# Patient Record
Sex: FEMALE | Race: WHITE
[De-identification: names, ages, dates, MRNs, and addresses within clinical notes are randomized per-mention and may not be internally consistent; named-entity substitution may affect disease eponyms.]

---

## 2018-02-13 ENCOUNTER — APPOINTMENT (OUTPATIENT)
Dept: INTERNAL MEDICINE | Facility: CLINIC | Age: 33
End: 2018-02-13
Payer: OTHER GOVERNMENT

## 2018-02-13 VITALS
OXYGEN SATURATION: 100 % | RESPIRATION RATE: 17 BRPM | WEIGHT: 180 LBS | DIASTOLIC BLOOD PRESSURE: 82 MMHG | SYSTOLIC BLOOD PRESSURE: 125 MMHG | TEMPERATURE: 98.1 F | BODY MASS INDEX: 27.28 KG/M2 | HEART RATE: 84 BPM | HEIGHT: 68 IN

## 2018-02-13 DIAGNOSIS — Z82.49 FAMILY HISTORY OF ISCHEMIC HEART DISEASE AND OTHER DISEASES OF THE CIRCULATORY SYSTEM: ICD-10-CM

## 2018-02-13 DIAGNOSIS — B97.7 PAPILLOMAVIRUS AS THE CAUSE OF DISEASES CLASSIFIED ELSEWHERE: ICD-10-CM

## 2018-02-13 PROCEDURE — 99204 OFFICE O/P NEW MOD 45 MIN: CPT

## 2018-02-13 RX ORDER — ELECTROLYTES/DEXTROSE
SOLUTION, ORAL ORAL
Refills: 0 | Status: ACTIVE | COMMUNITY

## 2018-03-06 ENCOUNTER — MEDICATION RENEWAL (OUTPATIENT)
Age: 33
End: 2018-03-06

## 2018-04-20 ENCOUNTER — LABORATORY RESULT (OUTPATIENT)
Age: 33
End: 2018-04-20

## 2018-04-20 ENCOUNTER — APPOINTMENT (OUTPATIENT)
Dept: INTERNAL MEDICINE | Facility: CLINIC | Age: 33
End: 2018-04-20
Payer: OTHER GOVERNMENT

## 2018-04-20 VITALS
DIASTOLIC BLOOD PRESSURE: 78 MMHG | OXYGEN SATURATION: 100 % | SYSTOLIC BLOOD PRESSURE: 122 MMHG | RESPIRATION RATE: 16 BRPM | HEART RATE: 81 BPM | TEMPERATURE: 98.3 F | BODY MASS INDEX: 26.22 KG/M2 | WEIGHT: 173 LBS | HEIGHT: 68 IN

## 2018-04-20 DIAGNOSIS — R00.2 PALPITATIONS: ICD-10-CM

## 2018-04-20 DIAGNOSIS — E66.3 OVERWEIGHT: ICD-10-CM

## 2018-04-20 DIAGNOSIS — R11.0 NAUSEA: ICD-10-CM

## 2018-04-20 DIAGNOSIS — R06.02 SHORTNESS OF BREATH: ICD-10-CM

## 2018-04-20 PROCEDURE — 99214 OFFICE O/P EST MOD 30 MIN: CPT

## 2018-04-20 RX ORDER — ALPRAZOLAM 0.25 MG/1
0.25 TABLET ORAL DAILY
Refills: 0 | Status: DISCONTINUED | COMMUNITY
End: 2018-04-20

## 2018-04-20 RX ORDER — PAROXETINE 25 MG/1
25 TABLET, FILM COATED, EXTENDED RELEASE ORAL
Qty: 30 | Refills: 1 | Status: DISCONTINUED | COMMUNITY
Start: 2018-02-13 | End: 2018-04-20

## 2018-04-22 PROBLEM — R06.02 SOB (SHORTNESS OF BREATH): Status: ACTIVE | Noted: 2018-04-20

## 2018-04-22 PROBLEM — R11.0 NAUSEA: Status: ACTIVE | Noted: 2018-02-13

## 2018-04-22 PROBLEM — R00.2 PALPITATION: Status: ACTIVE | Noted: 2018-04-20

## 2018-04-22 RX ORDER — ERGOCALCIFEROL 1.25 MG/1
1.25 MG CAPSULE, LIQUID FILLED ORAL
Qty: 8 | Refills: 0 | Status: ACTIVE | COMMUNITY
Start: 2018-04-22 | End: 1900-01-01

## 2018-04-23 DIAGNOSIS — R82.90 UNSPECIFIED ABNORMAL FINDINGS IN URINE: ICD-10-CM

## 2018-04-23 LAB
25(OH)D3 SERPL-MCNC: 5.3 NG/ML
ALBUMIN SERPL ELPH-MCNC: 4.5 G/DL
ALP BLD-CCNC: 46 U/L
ALT SERPL-CCNC: 18 U/L
ANION GAP SERPL CALC-SCNC: 14 MMOL/L
APPEARANCE: CLEAR
AST SERPL-CCNC: 24 U/L
BASOPHILS # BLD AUTO: 0.03 K/UL
BASOPHILS NFR BLD AUTO: 0.5 %
BILIRUB SERPL-MCNC: 0.2 MG/DL
BILIRUBIN URINE: NEGATIVE
BLOOD URINE: ABNORMAL
BUN SERPL-MCNC: 12 MG/DL
CALCIUM SERPL-MCNC: 9.2 MG/DL
CHLORIDE SERPL-SCNC: 103 MMOL/L
CHOLEST SERPL-MCNC: 164 MG/DL
CHOLEST/HDLC SERPL: 2.1 RATIO
CO2 SERPL-SCNC: 23 MMOL/L
COLOR: YELLOW
CREAT SERPL-MCNC: 0.7 MG/DL
EOSINOPHIL # BLD AUTO: 0.11 K/UL
EOSINOPHIL NFR BLD AUTO: 1.8 %
GLUCOSE QUALITATIVE U: NEGATIVE MG/DL
GLUCOSE SERPL-MCNC: 82 MG/DL
HCT VFR BLD CALC: 42.7 %
HDLC SERPL-MCNC: 77 MG/DL
HGB BLD-MCNC: 13.9 G/DL
IMM GRANULOCYTES NFR BLD AUTO: 0.2 %
KETONES URINE: NEGATIVE
LDLC SERPL CALC-MCNC: 78 MG/DL
LEUKOCYTE ESTERASE URINE: NEGATIVE
LYMPHOCYTES # BLD AUTO: 2.15 K/UL
LYMPHOCYTES NFR BLD AUTO: 34.3 %
MAN DIFF?: NORMAL
MCHC RBC-ENTMCNC: 32.2 PG
MCHC RBC-ENTMCNC: 32.6 GM/DL
MCV RBC AUTO: 98.8 FL
MONOCYTES # BLD AUTO: 0.58 K/UL
MONOCYTES NFR BLD AUTO: 9.3 %
NEUTROPHILS # BLD AUTO: 3.38 K/UL
NEUTROPHILS NFR BLD AUTO: 53.9 %
NITRITE URINE: NEGATIVE
PH URINE: 6.5
PLATELET # BLD AUTO: 324 K/UL
POTASSIUM SERPL-SCNC: 4.5 MMOL/L
PROT SERPL-MCNC: 7.7 G/DL
PROTEIN URINE: NEGATIVE MG/DL
RBC # BLD: 4.32 M/UL
RBC # FLD: 12.7 %
SODIUM SERPL-SCNC: 140 MMOL/L
SPECIFIC GRAVITY URINE: 1.02
TRIGL SERPL-MCNC: 43 MG/DL
TSH SERPL-ACNC: 1.06 UIU/ML
UROBILINOGEN URINE: NEGATIVE MG/DL
WBC # FLD AUTO: 6.26 K/UL

## 2018-07-24 ENCOUNTER — APPOINTMENT (OUTPATIENT)
Dept: PSYCHIATRY | Facility: CLINIC | Age: 33
End: 2018-07-24
Payer: OTHER GOVERNMENT

## 2018-07-24 PROCEDURE — 99205 OFFICE O/P NEW HI 60 MIN: CPT

## 2018-08-07 ENCOUNTER — RESULT REVIEW (OUTPATIENT)
Age: 33
End: 2018-08-07

## 2018-09-18 ENCOUNTER — APPOINTMENT (OUTPATIENT)
Dept: PSYCHIATRY | Facility: CLINIC | Age: 33
End: 2018-09-18
Payer: OTHER GOVERNMENT

## 2018-09-18 PROCEDURE — 99214 OFFICE O/P EST MOD 30 MIN: CPT

## 2018-09-18 RX ORDER — BUPROPION HYDROCHLORIDE 150 MG/1
150 TABLET, EXTENDED RELEASE ORAL DAILY
Qty: 30 | Refills: 2 | Status: DISCONTINUED | COMMUNITY
Start: 2018-04-20 | End: 2018-09-18

## 2018-10-09 ENCOUNTER — APPOINTMENT (OUTPATIENT)
Dept: PSYCHIATRY | Facility: CLINIC | Age: 33
End: 2018-10-09
Payer: OTHER GOVERNMENT

## 2018-10-09 PROCEDURE — 99213 OFFICE O/P EST LOW 20 MIN: CPT

## 2018-10-09 RX ORDER — GABAPENTIN 600 MG/1
600 TABLET, COATED ORAL 3 TIMES DAILY
Qty: 90 | Refills: 2 | Status: DISCONTINUED | COMMUNITY
Start: 2018-07-24 | End: 2018-10-09

## 2018-10-23 ENCOUNTER — APPOINTMENT (OUTPATIENT)
Dept: INTERNAL MEDICINE | Facility: CLINIC | Age: 33
End: 2018-10-23
Payer: OTHER GOVERNMENT

## 2018-10-23 VITALS
BODY MASS INDEX: 26.98 KG/M2 | WEIGHT: 178 LBS | TEMPERATURE: 98.3 F | HEIGHT: 68 IN | HEART RATE: 76 BPM | DIASTOLIC BLOOD PRESSURE: 84 MMHG | OXYGEN SATURATION: 100 % | RESPIRATION RATE: 16 BRPM | SYSTOLIC BLOOD PRESSURE: 133 MMHG

## 2018-10-23 PROCEDURE — 81002 URINALYSIS NONAUTO W/O SCOPE: CPT

## 2018-10-23 PROCEDURE — 99214 OFFICE O/P EST MOD 30 MIN: CPT

## 2018-10-23 NOTE — PHYSICAL EXAM
[General Appearance - Alert] : alert [General Appearance - In No Acute Distress] : in no acute distress [General Appearance - Well Nourished] : well nourished [General Appearance - Well-Appearing] : healthy appearing [Sclera] : the sclera and conjunctiva were normal [PERRL With Normal Accommodation] : pupils were equal in size, round, and reactive to light [Extraocular Movements] : extraocular movements were intact [Strabismus] : no strabismus was seen [Outer Ear] : the ears and nose were normal in appearance [Hearing Threshold Finger Rub Not [place]] : hearing was normal [Examination Of The Oral Cavity] : the lips and gums were normal [Both Tympanic Membranes Were Examined] : both tympanic membranes were normal [Nasal Cavity] : the nasal mucosa and septum were normal [Oropharynx] : the oropharynx was normal [Neck Appearance] : the appearance of the neck was normal [Neck Cervical Mass (___cm)] : no neck mass was observed [Jugular Venous Distention Increased] : there was no jugular-venous distention [] : no respiratory distress [Respiration, Rhythm And Depth] : normal respiratory rhythm and effort [Exaggerated Use Of Accessory Muscles For Inspiration] : no accessory muscle use [Auscultation Breath Sounds / Voice Sounds] : lungs were clear to auscultation bilaterally [Bowel Sounds] : normal bowel sounds [Abdomen Soft] : soft [Abdomen Tenderness] : non-tender [FreeTextEntry1] : no CVA [Cranial Nerves] : cranial nerves 2-12 were intact [Deep Tendon Reflexes (DTR)] : deep tendon reflexes were 2+ and symmetric [No Focal Deficits] : no focal deficits [Oriented To Time, Place, And Person] : oriented to person, place, and time [Impaired Insight] : insight and judgment were intact [Affect] : the affect was normal [Mood] : the mood was normal [Over the Past 2 Weeks, Have You Felt Down, Depressed, or Hopeless?] : 1.) Over the past 2 weeks, have you felt down, depressed, or hopeless? No [Over the Past 2 Weeks, Have You Felt Little Interest or Pleasure Doing Things?] : 2.) Over the past 2 weeks, have you felt little interest or pleasure doing things? No

## 2018-10-23 NOTE — PLAN
[FreeTextEntry1] : Assessment/ Plan: UTI\par -Refer to lab to provide clean urine sample for urinalysis and send out for culture\par -start empiric treatment with macrobid BID\par -f/u final culture and sensitivity to aid treatment decision if no improvement with bactrim \par - RTC if worsening symptoms, pelvic pain, fever/chills\par

## 2018-10-23 NOTE — HISTORY OF PRESENT ILLNESS
[FreeTextEntry8] : This 32 yo female  presents today complaining of increasing urinary frequency for the past day, she has been constantly going to the bathroom to urinate, and experiences a burning sensation when voiding. Today she also noted suprapubic pain. She also noted an odor. The patient reports that she has had a urinary tract infection before about 4 years ago, she was treated with antibiotics, and the infection seemed to clear up almost immediately.  Patient denies F/C.\par \par

## 2018-10-24 LAB
BILIRUB UR QL STRIP: NORMAL
CLARITY UR: NORMAL
COLLECTION METHOD: NORMAL
GLUCOSE UR-MCNC: NORMAL
HCG UR QL: 0.2 EU/DL
HGB UR QL STRIP.AUTO: NORMAL
KETONES UR-MCNC: NORMAL
LEUKOCYTE ESTERASE UR QL STRIP: NORMAL
NITRITE UR QL STRIP: POSITIVE
PH UR STRIP: 6
PROT UR STRIP-MCNC: NORMAL
SP GR UR STRIP: 1.01

## 2018-10-29 LAB — BACTERIA UR CULT: ABNORMAL

## 2018-11-21 ENCOUNTER — APPOINTMENT (OUTPATIENT)
Dept: PSYCHIATRY | Facility: CLINIC | Age: 33
End: 2018-11-21
Payer: OTHER GOVERNMENT

## 2018-11-21 DIAGNOSIS — G47.9 SLEEP DISORDER, UNSPECIFIED: ICD-10-CM

## 2018-11-21 PROCEDURE — 99213 OFFICE O/P EST LOW 20 MIN: CPT

## 2019-01-03 ENCOUNTER — RX RENEWAL (OUTPATIENT)
Age: 34
End: 2019-01-03

## 2019-02-05 ENCOUNTER — TRANSCRIPTION ENCOUNTER (OUTPATIENT)
Age: 34
End: 2019-02-05

## 2019-02-05 ENCOUNTER — RX RENEWAL (OUTPATIENT)
Age: 34
End: 2019-02-05

## 2019-02-11 ENCOUNTER — TRANSCRIPTION ENCOUNTER (OUTPATIENT)
Age: 34
End: 2019-02-11

## 2019-02-11 ENCOUNTER — APPOINTMENT (OUTPATIENT)
Dept: UROLOGY | Facility: CLINIC | Age: 34
End: 2019-02-11
Payer: OTHER GOVERNMENT

## 2019-02-11 DIAGNOSIS — R35.0 FREQUENCY OF MICTURITION: ICD-10-CM

## 2019-02-11 DIAGNOSIS — R39.15 URGENCY OF URINATION: ICD-10-CM

## 2019-02-11 DIAGNOSIS — Z87.442 PERSONAL HISTORY OF URINARY CALCULI: ICD-10-CM

## 2019-02-11 DIAGNOSIS — Z80.52 FAMILY HISTORY OF MALIGNANT NEOPLASM OF BLADDER: ICD-10-CM

## 2019-02-11 PROCEDURE — 99242 OFF/OP CONSLTJ NEW/EST SF 20: CPT

## 2019-02-11 RX ORDER — ALPRAZOLAM 0.25 MG/1
0.25 TABLET ORAL
Qty: 30 | Refills: 0 | Status: DISCONTINUED | COMMUNITY
Start: 2018-02-13 | End: 2019-02-11

## 2019-02-11 RX ORDER — NITROFURANTOIN (MONOHYDRATE/MACROCRYSTALS) 25; 75 MG/1; MG/1
100 CAPSULE ORAL TWICE DAILY
Qty: 14 | Refills: 0 | Status: DISCONTINUED | COMMUNITY
Start: 2018-10-23 | End: 2019-02-11

## 2019-02-11 NOTE — PHYSICAL EXAM
[General Appearance - Well Developed] : well developed [General Appearance - Well Nourished] : well nourished [Normal Appearance] : normal appearance [Well Groomed] : well groomed [General Appearance - In No Acute Distress] : no acute distress [Heart Rate And Rhythm] : Heart rate and rhythm were normal [Edema] : no peripheral edema [Respiration, Rhythm And Depth] : normal respiratory rhythm and effort [Exaggerated Use Of Accessory Muscles For Inspiration] : no accessory muscle use [Abdomen Soft] : soft [Abdomen Tenderness] : non-tender [Costovertebral Angle Tenderness] : no ~M costovertebral angle tenderness [Normal Station and Gait] : the gait and station were normal for the patient's age [Skin Color & Pigmentation] : normal skin color and pigmentation [] : no rash [Skin Lesions] : no skin lesions

## 2019-02-11 NOTE — ASSESSMENT
[FreeTextEntry1] : Pt w/ asymptomatic Gross Hematuria and no s/sx's of stones, but history of them w/ no imaging in many years.\par \par Discussed the multiple etiologies as cause for GH and importance of full  evaluation w/ : urine studies, cystosocpy and a ct urogram.  She demonstrated understanding and knows to contact me earlier than scheduled f/u appt. if any recurrent GH. No more abx as most recent cx neg. She is also aware that urgency/frequency can be exacerbated by many things:  food, fluid intake, what she eats, drinks; stress levels, bowel habits, a distal ureteral calculus or bladder neoplasm, etc.\par \par All questions answered to her satisfaction.

## 2019-02-11 NOTE — HISTORY OF PRESENT ILLNESS
[FreeTextEntry1] : SHAUNA LIZARRAGA is a 34 year old F who presents with inc urgency, frequency. Went to an urgicenter when she developed gross hematuria and was passing blood clots. Had GH only one other time, at age 17, but in setting of pos cx.  No h/o pain, dysuria, flank pain or discharge.  She was started on bactrim, which she took for 5 d prior to being called w/ a neg culture. No UA available from 2/5/19 visit.  Has h/o stones, but denies any similar sx's or constitutional sx's.\par

## 2019-02-11 NOTE — REVIEW OF SYSTEMS
[Fever] : fever [Chills] : chills [Feeling Poorly] : feeling poorly [Feeling Tired] : feeling tired [Palpitations] : palpitations [Shortness Of Breath] : shortness of breath [Wheezing] : wheezing [Abdominal Pain] : abdominal pain [Vomiting] : vomiting [Urine Infection (bladder/kidney)] : bladder/kidney infection [Pain during urination] : pain during urination [Blood in urine that you can see] : blood visible in urine [Told you have blood in urine on a urine test] : told blood was present in a urine test [History of kidney stones] : history of kidney stones [Wake up at night to urinate  How many times?  ___] : wakes up to urinate [unfilled] times during the night [Bladder pressure] : experiences bladder pressure [Negative] : Heme/Lymph [see HPI] : see HPI [Rapid Breathing] : rapid breathing [Anxiety] : anxiety [Depression] : depression [FreeTextEntry1] : nl periods; has breast lump and is due for breast imaging per pcp

## 2019-02-13 ENCOUNTER — MESSAGE (OUTPATIENT)
Age: 34
End: 2019-02-13

## 2019-02-19 LAB
APPEARANCE: CLEAR
BACTERIA UR CULT: NORMAL
BACTERIA: NEGATIVE
BILIRUBIN URINE: NEGATIVE
BLOOD URINE: NEGATIVE
COLOR: YELLOW
GLUCOSE QUALITATIVE U: NEGATIVE MG/DL
HYALINE CASTS: 2 /LPF
KETONES URINE: NEGATIVE
LEUKOCYTE ESTERASE URINE: NEGATIVE
MICROSCOPIC-UA: NORMAL
NITRITE URINE: NEGATIVE
PH URINE: 5.5
PROTEIN URINE: NEGATIVE MG/DL
RED BLOOD CELLS URINE: 2 /HPF
SPECIFIC GRAVITY URINE: 1.01
SQUAMOUS EPITHELIAL CELLS: 2 /HPF
UROBILINOGEN URINE: NEGATIVE MG/DL
WHITE BLOOD CELLS URINE: 2 /HPF

## 2019-02-20 ENCOUNTER — RESULT REVIEW (OUTPATIENT)
Age: 34
End: 2019-02-20

## 2019-02-21 ENCOUNTER — APPOINTMENT (OUTPATIENT)
Dept: CT IMAGING | Facility: CLINIC | Age: 34
End: 2019-02-21

## 2019-02-25 ENCOUNTER — APPOINTMENT (OUTPATIENT)
Dept: PSYCHIATRY | Facility: CLINIC | Age: 34
End: 2019-02-25
Payer: OTHER GOVERNMENT

## 2019-02-25 PROCEDURE — 99214 OFFICE O/P EST MOD 30 MIN: CPT

## 2019-02-27 ENCOUNTER — APPOINTMENT (OUTPATIENT)
Dept: CT IMAGING | Facility: CLINIC | Age: 34
End: 2019-02-27
Payer: OTHER GOVERNMENT

## 2019-02-27 ENCOUNTER — OUTPATIENT (OUTPATIENT)
Dept: OUTPATIENT SERVICES | Facility: HOSPITAL | Age: 34
LOS: 1 days | End: 2019-02-27
Payer: OTHER GOVERNMENT

## 2019-02-27 DIAGNOSIS — Z00.8 ENCOUNTER FOR OTHER GENERAL EXAMINATION: ICD-10-CM

## 2019-02-27 DIAGNOSIS — R35.0 FREQUENCY OF MICTURITION: ICD-10-CM

## 2019-02-27 DIAGNOSIS — Z87.442 PERSONAL HISTORY OF URINARY CALCULI: ICD-10-CM

## 2019-02-27 DIAGNOSIS — R31.0 GROSS HEMATURIA: ICD-10-CM

## 2019-02-27 PROCEDURE — 74178 CT ABD&PLV WO CNTR FLWD CNTR: CPT

## 2019-02-27 PROCEDURE — 74178 CT ABD&PLV WO CNTR FLWD CNTR: CPT | Mod: 26

## 2019-03-07 ENCOUNTER — APPOINTMENT (OUTPATIENT)
Dept: INTERNAL MEDICINE | Facility: CLINIC | Age: 34
End: 2019-03-07
Payer: OTHER GOVERNMENT

## 2019-03-07 ENCOUNTER — TRANSCRIPTION ENCOUNTER (OUTPATIENT)
Age: 34
End: 2019-03-07

## 2019-03-07 ENCOUNTER — OTHER (OUTPATIENT)
Age: 34
End: 2019-03-07

## 2019-03-07 VITALS
TEMPERATURE: 98.2 F | HEIGHT: 68 IN | BODY MASS INDEX: 27.28 KG/M2 | HEART RATE: 86 BPM | DIASTOLIC BLOOD PRESSURE: 76 MMHG | SYSTOLIC BLOOD PRESSURE: 114 MMHG | RESPIRATION RATE: 17 BRPM | OXYGEN SATURATION: 99 % | WEIGHT: 180 LBS

## 2019-03-07 DIAGNOSIS — R22.9 LOCALIZED SWELLING, MASS AND LUMP, UNSPECIFIED: ICD-10-CM

## 2019-03-07 PROCEDURE — 99395 PREV VISIT EST AGE 18-39: CPT

## 2019-03-12 LAB
25(OH)D3 SERPL-MCNC: 23.9 NG/ML
ALBUMIN SERPL ELPH-MCNC: 4.8 G/DL
ALP BLD-CCNC: 61 U/L
ALT SERPL-CCNC: 37 U/L
ANION GAP SERPL CALC-SCNC: 12 MMOL/L
APPEARANCE: CLEAR
AST SERPL-CCNC: 34 U/L
BASOPHILS # BLD AUTO: 0.05 K/UL
BASOPHILS NFR BLD AUTO: 0.7 %
BILIRUB SERPL-MCNC: 0.3 MG/DL
BILIRUBIN URINE: NEGATIVE
BLOOD URINE: NEGATIVE
BUN SERPL-MCNC: 7 MG/DL
CALCIUM SERPL-MCNC: 9.6 MG/DL
CHLORIDE SERPL-SCNC: 101 MMOL/L
CHOLEST SERPL-MCNC: 166 MG/DL
CHOLEST/HDLC SERPL: 1.8 RATIO
CO2 SERPL-SCNC: 25 MMOL/L
COLOR: COLORLESS
CREAT SERPL-MCNC: 0.69 MG/DL
EOSINOPHIL # BLD AUTO: 0.15 K/UL
EOSINOPHIL NFR BLD AUTO: 2.2 %
GLUCOSE QUALITATIVE U: NEGATIVE
GLUCOSE SERPL-MCNC: 75 MG/DL
HBA1C MFR BLD HPLC: 4.9 %
HCT VFR BLD CALC: 43.7 %
HDLC SERPL-MCNC: 95 MG/DL
HGB BLD-MCNC: 14.4 G/DL
IMM GRANULOCYTES NFR BLD AUTO: 0.1 %
KETONES URINE: NEGATIVE
LDLC SERPL CALC-MCNC: 61 MG/DL
LEUKOCYTE ESTERASE URINE: NEGATIVE
LYMPHOCYTES # BLD AUTO: 2.2 K/UL
LYMPHOCYTES NFR BLD AUTO: 32.8 %
MAN DIFF?: NORMAL
MCHC RBC-ENTMCNC: 32.9 PG
MCHC RBC-ENTMCNC: 33 GM/DL
MCV RBC AUTO: 99.8 FL
MONOCYTES # BLD AUTO: 0.74 K/UL
MONOCYTES NFR BLD AUTO: 11 %
NEUTROPHILS # BLD AUTO: 3.56 K/UL
NEUTROPHILS NFR BLD AUTO: 53.2 %
NITRITE URINE: NEGATIVE
PH URINE: 6.5
PLATELET # BLD AUTO: 311 K/UL
POTASSIUM SERPL-SCNC: 4.5 MMOL/L
PROT SERPL-MCNC: 7.7 G/DL
PROTEIN URINE: NEGATIVE
RBC # BLD: 4.38 M/UL
RBC # FLD: 12.6 %
SODIUM SERPL-SCNC: 138 MMOL/L
SPECIFIC GRAVITY URINE: 1.01
TRIGL SERPL-MCNC: 52 MG/DL
TSH SERPL-ACNC: 1.55 UIU/ML
UROBILINOGEN URINE: NORMAL
WBC # FLD AUTO: 6.71 K/UL

## 2019-03-25 ENCOUNTER — APPOINTMENT (OUTPATIENT)
Dept: PSYCHIATRY | Facility: CLINIC | Age: 34
End: 2019-03-25

## 2019-03-26 ENCOUNTER — APPOINTMENT (OUTPATIENT)
Dept: UROLOGY | Facility: CLINIC | Age: 34
End: 2019-03-26
Payer: OTHER GOVERNMENT

## 2019-03-26 VITALS
WEIGHT: 180 LBS | RESPIRATION RATE: 16 BRPM | OXYGEN SATURATION: 100 % | DIASTOLIC BLOOD PRESSURE: 83 MMHG | SYSTOLIC BLOOD PRESSURE: 129 MMHG | BODY MASS INDEX: 27.28 KG/M2 | TEMPERATURE: 97.2 F | HEART RATE: 91 BPM | HEIGHT: 68 IN

## 2019-03-26 DIAGNOSIS — R87.619 UNSPECIFIED ABNORMAL CYTOLOGICAL FINDINGS IN SPECIMENS FROM CERVIX UTERI: ICD-10-CM

## 2019-03-26 DIAGNOSIS — N20.0 CALCULUS OF KIDNEY: ICD-10-CM

## 2019-03-26 DIAGNOSIS — R31.0 GROSS HEMATURIA: ICD-10-CM

## 2019-03-26 PROCEDURE — 99213 OFFICE O/P EST LOW 20 MIN: CPT | Mod: 25

## 2019-03-26 PROCEDURE — 52000 CYSTOURETHROSCOPY: CPT

## 2019-03-26 RX ADMIN — PHENAZOPYRIDINE 0 MG: 100 TABLET, FILM COATED ORAL at 00:00

## 2019-03-26 NOTE — LETTER BODY
[Consult Letter:] : I had the pleasure of evaluating your patient, [unfilled]. [Please see my note below.] : Please see my note below. [Consult Closing:] : Thank you very much for allowing me to participate in the care of this patient.  If you have any questions, please do not hesitate to contact me. [Sincerely,] : Sincerely, [Dear  ___] : Dear  [unfilled], [FreeTextEntry3] : Sincerely,\par \par Rahel Robledo MD\par [facility]\par

## 2019-03-26 NOTE — HISTORY OF PRESENT ILLNESS
[FreeTextEntry1] : SHAUNA LIZARRAGA is a 34 year old F who presents with no current or further GH and no flank pain, w/ new dx of b/l renal calculi, w the largest being 5 mm in LK. Here for cystoscopy. Reminded that on cytology there were "rare atypical squamous cells" and that she needs follow up w/ a gyn in the very near future. Pt has a history of a few or repeated abnormal PAP smears and needs a new Gyn. Apparently, hers had told her she could wait 3-5 yrs to repeat another.\par \par Currently voiding w/out difficulty, flank pain or any S/Sx's of UTI, which she does get periodically. No vag discharge.

## 2019-03-26 NOTE — ASSESSMENT
[FreeTextEntry1] : Reviewed CT urogram and findings. We discussed maintaining a normal calcium, low sodium diet and need for increased hydration w/ 2.5-3 L/d, with most of it being water.  She should perform a 24 hr urine collection to further assess her urinary milieu, in order that she can make appropriate dietary modifications and/or w/ prescribed medications, we can dec the chance for inc in size of stones and/or inc in number, i.e. total stone burden.\par \par Should RTC 2 wks after mailing in the urine.  \par \par Was given referral to the [place] group of gynecologists, who also see pts in my office space in [place].\par \par Cystoscopy was normal and she was instructed to contact me w any GH, N/V/F/C or rigors in setting of flank pain and or UTI. Pyridium tablets given if needed for burning. Aware that it turns urine orange.

## 2019-03-28 ENCOUNTER — RX RENEWAL (OUTPATIENT)
Age: 34
End: 2019-03-28

## 2019-03-28 LAB
BACTERIA UR CULT: NORMAL
URINE CYTOLOGY: NORMAL

## 2019-04-04 ENCOUNTER — APPOINTMENT (OUTPATIENT)
Dept: PSYCHIATRY | Facility: CLINIC | Age: 34
End: 2019-04-04
Payer: OTHER GOVERNMENT

## 2019-04-04 PROCEDURE — 99214 OFFICE O/P EST MOD 30 MIN: CPT

## 2019-04-24 ENCOUNTER — RX RENEWAL (OUTPATIENT)
Age: 34
End: 2019-04-24

## 2019-06-16 ENCOUNTER — TRANSCRIPTION ENCOUNTER (OUTPATIENT)
Age: 34
End: 2019-06-16

## 2019-06-28 ENCOUNTER — APPOINTMENT (OUTPATIENT)
Dept: PSYCHIATRY | Facility: CLINIC | Age: 34
End: 2019-06-28
Payer: OTHER GOVERNMENT

## 2019-07-02 ENCOUNTER — APPOINTMENT (OUTPATIENT)
Dept: PSYCHIATRY | Facility: CLINIC | Age: 34
End: 2019-07-02
Payer: OTHER GOVERNMENT

## 2019-07-02 PROCEDURE — 99214 OFFICE O/P EST MOD 30 MIN: CPT

## 2019-07-18 ENCOUNTER — APPOINTMENT (OUTPATIENT)
Dept: DERMATOLOGY | Facility: CLINIC | Age: 34
End: 2019-07-18

## 2019-07-31 ENCOUNTER — APPOINTMENT (OUTPATIENT)
Dept: INTERNAL MEDICINE | Facility: CLINIC | Age: 34
End: 2019-07-31

## 2019-09-27 ENCOUNTER — RX RENEWAL (OUTPATIENT)
Age: 34
End: 2019-09-27

## 2019-10-01 ENCOUNTER — APPOINTMENT (OUTPATIENT)
Dept: PSYCHIATRY | Facility: CLINIC | Age: 34
End: 2019-10-01

## 2019-10-23 ENCOUNTER — APPOINTMENT (OUTPATIENT)
Dept: PSYCHIATRY | Facility: CLINIC | Age: 34
End: 2019-10-23
Payer: OTHER GOVERNMENT

## 2019-10-23 PROCEDURE — 99214 OFFICE O/P EST MOD 30 MIN: CPT

## 2019-10-29 ENCOUNTER — MEDICATION RENEWAL (OUTPATIENT)
Age: 34
End: 2019-10-29

## 2019-12-04 ENCOUNTER — MEDICATION RENEWAL (OUTPATIENT)
Age: 34
End: 2019-12-04

## 2020-01-02 ENCOUNTER — MEDICATION RENEWAL (OUTPATIENT)
Age: 35
End: 2020-01-02

## 2020-01-02 ENCOUNTER — TRANSCRIPTION ENCOUNTER (OUTPATIENT)
Age: 35
End: 2020-01-02

## 2020-01-06 ENCOUNTER — APPOINTMENT (OUTPATIENT)
Dept: INTERNAL MEDICINE | Facility: CLINIC | Age: 35
End: 2020-01-06
Payer: OTHER GOVERNMENT

## 2020-01-06 VITALS
SYSTOLIC BLOOD PRESSURE: 119 MMHG | TEMPERATURE: 97.6 F | HEART RATE: 89 BPM | HEIGHT: 68 IN | WEIGHT: 172 LBS | DIASTOLIC BLOOD PRESSURE: 83 MMHG | OXYGEN SATURATION: 100 % | RESPIRATION RATE: 17 BRPM | BODY MASS INDEX: 26.07 KG/M2

## 2020-01-06 DIAGNOSIS — R11.2 NAUSEA WITH VOMITING, UNSPECIFIED: ICD-10-CM

## 2020-01-06 DIAGNOSIS — K52.9 NONINFECTIVE GASTROENTERITIS AND COLITIS, UNSPECIFIED: ICD-10-CM

## 2020-01-06 PROCEDURE — 99214 OFFICE O/P EST MOD 30 MIN: CPT

## 2020-01-06 RX ORDER — ONDANSETRON 4 MG/1
4 TABLET, ORALLY DISINTEGRATING ORAL EVERY 8 HOURS
Qty: 30 | Refills: 0 | Status: DISCONTINUED | COMMUNITY
Start: 2020-01-06 | End: 2020-01-06

## 2020-01-14 ENCOUNTER — APPOINTMENT (OUTPATIENT)
Dept: PSYCHIATRY | Facility: CLINIC | Age: 35
End: 2020-01-14
Payer: OTHER GOVERNMENT

## 2020-01-14 PROCEDURE — 99213 OFFICE O/P EST LOW 20 MIN: CPT

## 2020-03-24 ENCOUNTER — APPOINTMENT (OUTPATIENT)
Dept: INTERNAL MEDICINE | Facility: CLINIC | Age: 35
End: 2020-03-24

## 2020-04-09 ENCOUNTER — APPOINTMENT (OUTPATIENT)
Dept: PSYCHIATRY | Facility: CLINIC | Age: 35
End: 2020-04-09
Payer: OTHER GOVERNMENT

## 2020-04-09 PROCEDURE — 99442: CPT

## 2020-04-09 RX ORDER — MIRTAZAPINE 15 MG/1
15 TABLET, FILM COATED ORAL
Qty: 30 | Refills: 2 | Status: DISCONTINUED | COMMUNITY
Start: 2019-02-25 | End: 2020-04-09

## 2020-04-22 ENCOUNTER — APPOINTMENT (OUTPATIENT)
Dept: DERMATOLOGY | Facility: CLINIC | Age: 35
End: 2020-04-22

## 2020-05-22 ENCOUNTER — TRANSCRIPTION ENCOUNTER (OUTPATIENT)
Age: 35
End: 2020-05-22

## 2020-06-10 RX ORDER — ALPRAZOLAM 0.5 MG/1
0.5 TABLET ORAL
Qty: 15 | Refills: 0 | Status: DISCONTINUED | COMMUNITY
Start: 2018-07-24 | End: 2020-06-10

## 2020-07-07 ENCOUNTER — APPOINTMENT (OUTPATIENT)
Dept: PSYCHIATRY | Facility: CLINIC | Age: 35
End: 2020-07-07
Payer: OTHER GOVERNMENT

## 2020-07-07 PROCEDURE — 99213 OFFICE O/P EST LOW 20 MIN: CPT

## 2020-07-07 RX ORDER — ALPRAZOLAM 0.5 MG/1
0.5 TABLET, EXTENDED RELEASE ORAL
Qty: 7 | Refills: 0 | Status: DISCONTINUED | COMMUNITY
Start: 2020-06-10 | End: 2020-07-07

## 2020-07-07 RX ORDER — HYDROXYZINE HYDROCHLORIDE 25 MG/1
25 TABLET ORAL
Qty: 120 | Refills: 1 | Status: DISCONTINUED | COMMUNITY
Start: 2020-04-09 | End: 2020-07-07

## 2020-08-04 ENCOUNTER — APPOINTMENT (OUTPATIENT)
Dept: INTERNAL MEDICINE | Facility: CLINIC | Age: 35
End: 2020-08-04
Payer: OTHER GOVERNMENT

## 2020-08-04 VITALS
HEART RATE: 70 BPM | DIASTOLIC BLOOD PRESSURE: 82 MMHG | WEIGHT: 182 LBS | BODY MASS INDEX: 27.58 KG/M2 | OXYGEN SATURATION: 99 % | HEIGHT: 68 IN | SYSTOLIC BLOOD PRESSURE: 119 MMHG | RESPIRATION RATE: 17 BRPM | TEMPERATURE: 97.6 F

## 2020-08-04 DIAGNOSIS — Z00.00 ENCOUNTER FOR GENERAL ADULT MEDICAL EXAMINATION W/OUT ABNORMAL FINDINGS: ICD-10-CM

## 2020-08-04 DIAGNOSIS — E55.9 VITAMIN D DEFICIENCY, UNSPECIFIED: ICD-10-CM

## 2020-08-04 DIAGNOSIS — R21 RASH AND OTHER NONSPECIFIC SKIN ERUPTION: ICD-10-CM

## 2020-08-04 PROCEDURE — 99385 PREV VISIT NEW AGE 18-39: CPT

## 2020-08-04 RX ORDER — ESCITALOPRAM OXALATE 20 MG/1
20 TABLET ORAL
Refills: 0 | Status: ACTIVE | COMMUNITY
Start: 2020-08-04

## 2020-08-05 LAB
25(OH)D3 SERPL-MCNC: 38.1 NG/ML
ALBUMIN SERPL ELPH-MCNC: 4.4 G/DL
ALP BLD-CCNC: 65 U/L
ALT SERPL-CCNC: 24 U/L
ANION GAP SERPL CALC-SCNC: 12 MMOL/L
APPEARANCE: CLEAR
AST SERPL-CCNC: 29 U/L
BASOPHILS # BLD AUTO: 0.04 K/UL
BASOPHILS NFR BLD AUTO: 0.6 %
BILIRUB SERPL-MCNC: 0.3 MG/DL
BILIRUBIN URINE: NEGATIVE
BLOOD URINE: NEGATIVE
BUN SERPL-MCNC: 14 MG/DL
CALCIUM SERPL-MCNC: 9.6 MG/DL
CHLORIDE SERPL-SCNC: 104 MMOL/L
CHOLEST SERPL-MCNC: 173 MG/DL
CHOLEST/HDLC SERPL: 1.9 RATIO
CO2 SERPL-SCNC: 24 MMOL/L
COLOR: NORMAL
CREAT SERPL-MCNC: 0.7 MG/DL
EOSINOPHIL # BLD AUTO: 0.13 K/UL
EOSINOPHIL NFR BLD AUTO: 1.9 %
ESTIMATED AVERAGE GLUCOSE: 85 MG/DL
GLUCOSE QUALITATIVE U: NEGATIVE
GLUCOSE SERPL-MCNC: 88 MG/DL
HBA1C MFR BLD HPLC: 4.6 %
HCT VFR BLD CALC: 40.8 %
HDLC SERPL-MCNC: 93 MG/DL
HGB BLD-MCNC: 13.2 G/DL
IMM GRANULOCYTES NFR BLD AUTO: 0.3 %
KETONES URINE: NEGATIVE
LDLC SERPL CALC-MCNC: 63 MG/DL
LEUKOCYTE ESTERASE URINE: NEGATIVE
LYMPHOCYTES # BLD AUTO: 1.61 K/UL
LYMPHOCYTES NFR BLD AUTO: 23.6 %
MAN DIFF?: NORMAL
MCHC RBC-ENTMCNC: 32.4 GM/DL
MCHC RBC-ENTMCNC: 32.8 PG
MCV RBC AUTO: 101.2 FL
MONOCYTES # BLD AUTO: 0.87 K/UL
MONOCYTES NFR BLD AUTO: 12.8 %
NEUTROPHILS # BLD AUTO: 4.14 K/UL
NEUTROPHILS NFR BLD AUTO: 60.8 %
NITRITE URINE: NEGATIVE
PH URINE: 6.5
PLATELET # BLD AUTO: 301 K/UL
POTASSIUM SERPL-SCNC: 4.2 MMOL/L
PROT SERPL-MCNC: 7.1 G/DL
PROTEIN URINE: NORMAL
RBC # BLD: 4.03 M/UL
RBC # FLD: 12.7 %
SODIUM SERPL-SCNC: 139 MMOL/L
SPECIFIC GRAVITY URINE: 1.02
TRIGL SERPL-MCNC: 84 MG/DL
TSH SERPL-ACNC: 2.38 UIU/ML
UROBILINOGEN URINE: NORMAL
WBC # FLD AUTO: 6.81 K/UL

## 2020-08-05 RX ORDER — CLOTRIMAZOLE AND BETAMETHASONE DIPROPIONATE 10; .5 MG/G; MG/G
1-0.05 CREAM TOPICAL TWICE DAILY
Qty: 2 | Refills: 3 | Status: ACTIVE | COMMUNITY
Start: 2020-08-04 | End: 1900-01-01

## 2020-09-29 ENCOUNTER — APPOINTMENT (OUTPATIENT)
Dept: INTERNAL MEDICINE | Facility: CLINIC | Age: 35
End: 2020-09-29

## 2020-10-07 ENCOUNTER — APPOINTMENT (OUTPATIENT)
Dept: PSYCHIATRY | Facility: CLINIC | Age: 35
End: 2020-10-07
Payer: OTHER GOVERNMENT

## 2020-10-07 PROCEDURE — 99214 OFFICE O/P EST MOD 30 MIN: CPT

## 2020-12-08 ENCOUNTER — TRANSCRIPTION ENCOUNTER (OUTPATIENT)
Age: 35
End: 2020-12-08

## 2021-01-12 ENCOUNTER — APPOINTMENT (OUTPATIENT)
Dept: PSYCHIATRY | Facility: CLINIC | Age: 36
End: 2021-01-12
Payer: OTHER GOVERNMENT

## 2021-01-12 DIAGNOSIS — F40.10 SOCIAL PHOBIA, UNSPECIFIED: ICD-10-CM

## 2021-01-12 PROCEDURE — 99214 OFFICE O/P EST MOD 30 MIN: CPT

## 2021-01-12 PROCEDURE — 99072 ADDL SUPL MATRL&STAF TM PHE: CPT

## 2021-03-09 ENCOUNTER — TRANSCRIPTION ENCOUNTER (OUTPATIENT)
Age: 36
End: 2021-03-09

## 2021-04-14 ENCOUNTER — APPOINTMENT (OUTPATIENT)
Dept: PSYCHIATRY | Facility: CLINIC | Age: 36
End: 2021-04-14
Payer: OTHER GOVERNMENT

## 2021-04-14 DIAGNOSIS — G47.00 INSOMNIA, UNSPECIFIED: ICD-10-CM

## 2021-04-14 DIAGNOSIS — F32.9 ANXIETY DISORDER, UNSPECIFIED: ICD-10-CM

## 2021-04-14 DIAGNOSIS — F41.0 PANIC DISORDER [EPISODIC PAROXYSMAL ANXIETY]: ICD-10-CM

## 2021-04-14 DIAGNOSIS — F41.9 ANXIETY DISORDER, UNSPECIFIED: ICD-10-CM

## 2021-04-14 PROCEDURE — 99072 ADDL SUPL MATRL&STAF TM PHE: CPT

## 2021-04-14 PROCEDURE — 99214 OFFICE O/P EST MOD 30 MIN: CPT

## 2021-04-14 RX ORDER — ESCITALOPRAM OXALATE 20 MG/1
20 TABLET ORAL
Qty: 90 | Refills: 0 | Status: ACTIVE | COMMUNITY
Start: 2018-07-24 | End: 1900-01-01

## 2021-04-15 RX ORDER — PROPRANOLOL HYDROCHLORIDE 20 MG/1
20 TABLET ORAL
Qty: 90 | Refills: 2 | Status: ACTIVE | COMMUNITY
Start: 2018-02-13 | End: 1900-01-01

## 2021-04-22 RX ORDER — LORAZEPAM 0.5 MG/1
0.5 TABLET ORAL DAILY
Qty: 30 | Refills: 0 | Status: ACTIVE | COMMUNITY
Start: 2019-02-25 | End: 1900-01-01

## 2021-04-24 RX ORDER — ONDANSETRON 4 MG/1
4 TABLET ORAL EVERY 8 HOURS
Qty: 30 | Refills: 0 | Status: ACTIVE | COMMUNITY
Start: 2018-02-13 | End: 1900-01-01

## 2021-12-19 ENCOUNTER — CLINICAL SUPPORT (OUTPATIENT)
Dept: URGENT CARE | Facility: CLINIC | Age: 36
End: 2021-12-19
Payer: OTHER GOVERNMENT

## 2021-12-19 DIAGNOSIS — Z11.59 ENCOUNTER FOR SCREENING FOR OTHER VIRAL DISEASES: Primary | ICD-10-CM

## 2021-12-19 LAB
CTP QC/QA: YES
SARS-COV-2 RDRP RESP QL NAA+PROBE: NEGATIVE

## 2021-12-19 PROCEDURE — U0002: ICD-10-PCS | Mod: QW,S$GLB,, | Performed by: NURSE PRACTITIONER

## 2021-12-19 PROCEDURE — U0002 COVID-19 LAB TEST NON-CDC: HCPCS | Mod: QW,S$GLB,, | Performed by: NURSE PRACTITIONER

## 2022-01-10 ENCOUNTER — OFFICE VISIT (OUTPATIENT)
Dept: URGENT CARE | Facility: CLINIC | Age: 37
End: 2022-01-10
Payer: OTHER GOVERNMENT

## 2022-01-10 VITALS
WEIGHT: 175 LBS | BODY MASS INDEX: 26.52 KG/M2 | DIASTOLIC BLOOD PRESSURE: 72 MMHG | SYSTOLIC BLOOD PRESSURE: 121 MMHG | HEIGHT: 68 IN | OXYGEN SATURATION: 97 % | RESPIRATION RATE: 16 BRPM | TEMPERATURE: 97 F | HEART RATE: 80 BPM

## 2022-01-10 DIAGNOSIS — B34.9 ACUTE VIRAL SYNDROME: Primary | ICD-10-CM

## 2022-01-10 DIAGNOSIS — R05.9 COUGH: ICD-10-CM

## 2022-01-10 DIAGNOSIS — R11.0 NAUSEA: ICD-10-CM

## 2022-01-10 DIAGNOSIS — R52 GENERALIZED BODY ACHES: ICD-10-CM

## 2022-01-10 LAB
CTP QC/QA: YES
SARS-COV-2 RDRP RESP QL NAA+PROBE: NEGATIVE

## 2022-01-10 PROCEDURE — U0002: ICD-10-PCS | Mod: QW,S$GLB,, | Performed by: NURSE PRACTITIONER

## 2022-01-10 PROCEDURE — 99203 OFFICE O/P NEW LOW 30 MIN: CPT | Mod: S$GLB,,, | Performed by: NURSE PRACTITIONER

## 2022-01-10 PROCEDURE — U0002 COVID-19 LAB TEST NON-CDC: HCPCS | Mod: QW,S$GLB,, | Performed by: NURSE PRACTITIONER

## 2022-01-10 PROCEDURE — 99203 PR OFFICE/OUTPT VISIT, NEW, LEVL III, 30-44 MIN: ICD-10-PCS | Mod: S$GLB,,, | Performed by: NURSE PRACTITIONER

## 2022-01-10 RX ORDER — ESCITALOPRAM OXALATE 20 MG/1
20 TABLET ORAL DAILY
COMMUNITY
Start: 2021-11-29

## 2022-01-10 RX ORDER — PROPRANOLOL HYDROCHLORIDE 20 MG/1
TABLET ORAL
COMMUNITY
Start: 2021-04-14

## 2022-01-10 RX ORDER — ONDANSETRON 4 MG/1
TABLET, FILM COATED ORAL
COMMUNITY
Start: 2021-01-20

## 2022-01-10 RX ORDER — LORAZEPAM 0.5 MG/1
TABLET ORAL
COMMUNITY
Start: 2021-05-24

## 2022-01-10 RX ORDER — ONDANSETRON 4 MG/1
4 TABLET, ORALLY DISINTEGRATING ORAL EVERY 6 HOURS PRN
Qty: 12 TABLET | Refills: 0 | Status: SHIPPED | OUTPATIENT
Start: 2022-01-10

## 2022-01-10 NOTE — PROGRESS NOTES
"Subjective:       Patient ID: Megan Navarro is a 36 y.o. female.    Vitals:  height is 5' 8" (1.727 m) and weight is 79.4 kg (175 lb). Her temperature is 97.4 °F (36.3 °C). Her blood pressure is 121/72 and her pulse is 80. Her respiration is 16 and oxygen saturation is 97%.     Chief Complaint: Cough    Pt presents with c o dry cough, body aches, nausea x 2 days. Treating sx with tylenol. Covid vaccinated. Positive covid exposure from  and coworkers.    Cough  This is a new problem. The current episode started yesterday. The cough is non-productive. Associated symptoms include myalgias. Treatments tried: tylenol.       Respiratory: Positive for cough.    Musculoskeletal: Positive for muscle ache.       Objective:      Physical Exam   Constitutional: She is oriented to person, place, and time. She appears well-developed and well-nourished. She is cooperative.  Non-toxic appearance. She does not appear ill. No distress.   HENT:   Head: Normocephalic and atraumatic.   Ears:   Right Ear: Hearing, tympanic membrane, external ear and ear canal normal.   Left Ear: Hearing, tympanic membrane, external ear and ear canal normal.   Nose: Nose normal. No mucosal edema, rhinorrhea or nasal deformity. No epistaxis. Right sinus exhibits no maxillary sinus tenderness and no frontal sinus tenderness. Left sinus exhibits no maxillary sinus tenderness and no frontal sinus tenderness.   Mouth/Throat: Uvula is midline, oropharynx is clear and moist and mucous membranes are normal. No trismus in the jaw. Normal dentition. No uvula swelling. No posterior oropharyngeal erythema.   Eyes: Conjunctivae and lids are normal. Right eye exhibits no discharge. Left eye exhibits no discharge. No scleral icterus.   Neck: Trachea normal and phonation normal. Neck supple.   Cardiovascular: Normal rate, regular rhythm, normal heart sounds, intact distal pulses and normal pulses.   Pulmonary/Chest: Effort normal and breath sounds normal. No " respiratory distress.   Abdominal: Normal appearance and bowel sounds are normal. She exhibits no distension and no mass. Soft. There is no abdominal tenderness.   Musculoskeletal: Normal range of motion.         General: No deformity or edema. Normal range of motion.   Neurological: She is alert and oriented to person, place, and time. She exhibits normal muscle tone. Coordination normal.   Skin: Skin is warm, dry, intact, not diaphoretic and not pale.   Psychiatric: She has a normal mood and affect. Her speech is normal and behavior is normal. Judgment and thought content normal.   Nursing note and vitals reviewed.        Results for orders placed or performed in visit on 01/10/22   POCT COVID-19 Rapid Screening   Result Value Ref Range    POC Rapid COVID Negative Negative     Acceptable Yes      Assessment:       1. Acute viral syndrome    2. Cough    3. Generalized body aches    4. Nausea          Plan:         Acute viral syndrome    Cough  -     POCT COVID-19 Rapid Screening    Generalized body aches    Nausea  -     ondansetron (ZOFRAN-ODT) 4 MG TbDL; Take 1 tablet (4 mg total) by mouth every 6 (six) hours as needed (nausea).  Dispense: 12 tablet; Refill: 0      Patient Instructions   Increase clear liquids (water, gatorade, pedialyte, broths, jello, etc). BRAT diet (banana, rice, applesauce, tea, toast/crackers), then advance further as tolerated.    Take zofran every 6-8 hours as needed for nausea.     Use over the counter cough suppressants such as robitussin or delsym.      Over the counter you can use Tylenol (acetominophen) or Ibuprofen for your minor aches and pains as long as you have no contraindications.    Good nutrition. Lots of rest. Plenty of fluids    You must understand that you've received an Urgent Care treatment only and that you may be released before all your medical problems are known or treated. You, the patient, will arrange for follow up care as instructed.  Follow up  with your PCP or specialty clinic as directed in the next 1-2 weeks if not improved or as needed.  You can call [phone] to schedule an appointment with the appropriate provider.  If your condition worsens we recommend that you receive another evaluation at the emergency room immediately or contact your primary medical clinics after hours call service to discuss your concerns.  Please return here or go to the Emergency Department for any concerns or worsening of condition.    Patient Education       Viral Syndrome Discharge Instructions   About this topic   Viral syndrome is an illness with signs like you would get with a cold or the flu. A tiny germ called a virus causes this infection. Most people get better in 1 to 2 weeks without treatment. This illness spreads easily from person to person.     What care is needed at home?   · Ask your doctor what you need to do when you go home. Make sure you ask questions if you do not understand what the doctor says. This way you will know what you need to do.  · Drink lots of water, juice, or broth to replace fluids lost in runny nose and fever. Suck on ice chips or popsicles to ease throat pain.  · Get lots of rest and sleep. Sleep helps your body get back the energy it needs.  · Stay away from others until you are feeling better.  What follow-up care is needed?   Your doctor may ask you to make visits to the office to check on your progress. Be sure to keep these visits.  What drugs may be needed?   The doctor may order drugs to:  · Help with pain  · Lower fever  · Help with pain from a sore throat  · Help a runny or stuffy nose  · Ease or stop coughing  Will physical activity be limited?   You need to rest while you are getting better. This means you may need to limit your activity until you feel well.  What changes to diet are needed?   Eat foods that will not upset your stomach like chicken soup, bananas, rice, apples, or toast.  What problems could happen?    · Lung problems like pneumonia and bronchitis  · Too much fluid loss  · Infection  What can be done to prevent this health problem?   · If you are sick, cover your mouth and nose with tissue when you cough or sneeze. You can also cough into your elbow. Throw away tissues in the trash and wash your hands after touching used tissues.  · Wash your hands often with soap and water for at least 20 seconds, especially after coughing or sneezing. Alcohol-based hand sanitizers also work to kill the virus.  · Do not get too close (kissing, hugging) to people who are sick.  · Stay away from crowded places.  · Do not share towels or hankies with anyone who is sick. Clean commonly handled things like door handles, remotes, toys, and phones. Wipe them with a disinfectant.  · Take vitamin C to help build up your body's ability to fight disease.  · Get a flu shot each year.  When do I need to call the doctor?   · Signs of infection. These include a fever of 100.4°F (38°C) or higher, chills, very bad sore throat, ear or sinus pain, cough, more sputum or change in color of sputum, and mouth sores.  · Trouble breathing  · Very bad throwing up or throwing up that does not stop  · Health problem is not better or you are feeling worse  Teach Back: Helping You Understand   The Teach Back Method helps you understand the information we are giving you. After you talk with the staff, tell them in your own words what you learned. This helps to make sure the staff has described each thing clearly. It also helps to explain things that may have been confusing. Before going home, make sure you can do these:  · I can tell you about my condition.  · I can tell you what may help ease my sore throat.  · I can tell you what I can do to help avoid passing the infection to others.  · I can tell you what I will do if I have trouble breathing, very bad throwing up, or throwing up that does not stop.  Last Reviewed Date   2020-08-24  Consumer Information  Use and Disclaimer   This information is not specific medical advice and does not replace information you receive from your health care provider. This is only a brief summary of general information. It does NOT include all information about conditions, illnesses, injuries, tests, procedures, treatments, therapies, discharge instructions or life-style choices that may apply to you. You must talk with your health care provider for complete information about your health and treatment options. This information should not be used to decide whether or not to accept your health care providers advice, instructions or recommendations. Only your health care provider has the knowledge and training to provide advice that is right for you.  Copyright   Copyright © 2021 [employer] and its affiliates and/or licensors. All rights reserved.

## 2022-01-10 NOTE — PATIENT INSTRUCTIONS
Increase clear liquids (water, gatorade, pedialyte, broths, jello, etc). BRAT diet (banana, rice, applesauce, tea, toast/crackers), then advance further as tolerated.    Take zofran every 6-8 hours as needed for nausea.     Use over the counter cough suppressants such as robitussin or delsym.      Over the counter you can use Tylenol (acetominophen) or Ibuprofen for your minor aches and pains as long as you have no contraindications.    Good nutrition. Lots of rest. Plenty of fluids    You must understand that you've received an Urgent Care treatment only and that you may be released before all your medical problems are known or treated. You, the patient, will arrange for follow up care as instructed.  Follow up with your PCP or specialty clinic as directed in the next 1-2 weeks if not improved or as needed.  You can call [phone] to schedule an appointment with the appropriate provider.  If your condition worsens we recommend that you receive another evaluation at the emergency room immediately or contact your primary medical clinics after hours call service to discuss your concerns.  Please return here or go to the Emergency Department for any concerns or worsening of condition.    Patient Education       Viral Syndrome Discharge Instructions   About this topic   Viral syndrome is an illness with signs like you would get with a cold or the flu. A tiny germ called a virus causes this infection. Most people get better in 1 to 2 weeks without treatment. This illness spreads easily from person to person.     What care is needed at home?   · Ask your doctor what you need to do when you go home. Make sure you ask questions if you do not understand what the doctor says. This way you will know what you need to do.  · Drink lots of water, juice, or broth to replace fluids lost in runny nose and fever. Suck on ice chips or popsicles to ease throat pain.  · Get lots of rest and sleep. Sleep helps your body get back the  energy it needs.  · Stay away from others until you are feeling better.  What follow-up care is needed?   Your doctor may ask you to make visits to the office to check on your progress. Be sure to keep these visits.  What drugs may be needed?   The doctor may order drugs to:  · Help with pain  · Lower fever  · Help with pain from a sore throat  · Help a runny or stuffy nose  · Ease or stop coughing  Will physical activity be limited?   You need to rest while you are getting better. This means you may need to limit your activity until you feel well.  What changes to diet are needed?   Eat foods that will not upset your stomach like chicken soup, bananas, rice, apples, or toast.  What problems could happen?   · Lung problems like pneumonia and bronchitis  · Too much fluid loss  · Infection  What can be done to prevent this health problem?   · If you are sick, cover your mouth and nose with tissue when you cough or sneeze. You can also cough into your elbow. Throw away tissues in the trash and wash your hands after touching used tissues.  · Wash your hands often with soap and water for at least 20 seconds, especially after coughing or sneezing. Alcohol-based hand sanitizers also work to kill the virus.  · Do not get too close (kissing, hugging) to people who are sick.  · Stay away from crowded places.  · Do not share towels or hankies with anyone who is sick. Clean commonly handled things like door handles, remotes, toys, and phones. Wipe them with a disinfectant.  · Take vitamin C to help build up your body's ability to fight disease.  · Get a flu shot each year.  When do I need to call the doctor?   · Signs of infection. These include a fever of 100.4°F (38°C) or higher, chills, very bad sore throat, ear or sinus pain, cough, more sputum or change in color of sputum, and mouth sores.  · Trouble breathing  · Very bad throwing up or throwing up that does not stop  · Health problem is not better or you are feeling  worse  Teach Back: Helping You Understand   The Teach Back Method helps you understand the information we are giving you. After you talk with the staff, tell them in your own words what you learned. This helps to make sure the staff has described each thing clearly. It also helps to explain things that may have been confusing. Before going home, make sure you can do these:  · I can tell you about my condition.  · I can tell you what may help ease my sore throat.  · I can tell you what I can do to help avoid passing the infection to others.  · I can tell you what I will do if I have trouble breathing, very bad throwing up, or throwing up that does not stop.  Last Reviewed Date   2020-08-24  Consumer Information Use and Disclaimer   This information is not specific medical advice and does not replace information you receive from your health care provider. This is only a brief summary of general information. It does NOT include all information about conditions, illnesses, injuries, tests, procedures, treatments, therapies, discharge instructions or life-style choices that may apply to you. You must talk with your health care provider for complete information about your health and treatment options. This information should not be used to decide whether or not to accept your health care providers advice, instructions or recommendations. Only your health care provider has the knowledge and training to provide advice that is right for you.  Copyright   Copyright © 2021 [employer] and its affiliates and/or licensors. All rights reserved.

## 2022-02-28 ENCOUNTER — OFFICE VISIT (OUTPATIENT)
Dept: URGENT CARE | Facility: CLINIC | Age: 37
End: 2022-02-28
Payer: OTHER GOVERNMENT

## 2022-02-28 VITALS
WEIGHT: 170 LBS | TEMPERATURE: 98 F | BODY MASS INDEX: 25.76 KG/M2 | OXYGEN SATURATION: 98 % | SYSTOLIC BLOOD PRESSURE: 110 MMHG | DIASTOLIC BLOOD PRESSURE: 66 MMHG | RESPIRATION RATE: 20 BRPM | HEART RATE: 96 BPM | HEIGHT: 68 IN

## 2022-02-28 DIAGNOSIS — R11.2 NON-INTRACTABLE VOMITING WITH NAUSEA, UNSPECIFIED VOMITING TYPE: ICD-10-CM

## 2022-02-28 DIAGNOSIS — R10.2 SUPRAPUBIC PAIN: ICD-10-CM

## 2022-02-28 DIAGNOSIS — N20.0 NEPHROLITHIASIS: Primary | ICD-10-CM

## 2022-02-28 LAB
B-HCG UR QL: NEGATIVE
BILIRUB UR QL STRIP: NEGATIVE
CTP QC/QA: YES
GLUCOSE UR QL STRIP: NEGATIVE
KETONES UR QL STRIP: POSITIVE
LEUKOCYTE ESTERASE UR QL STRIP: NEGATIVE
PH, POC UA: 7 (ref 5–8)
POC BLOOD, URINE: NEGATIVE
POC NITRATES, URINE: NEGATIVE
PROT UR QL STRIP: POSITIVE
SP GR UR STRIP: 1.01 (ref 1–1.03)
UROBILINOGEN UR STRIP-ACNC: NORMAL (ref 0.1–1.1)

## 2022-02-28 PROCEDURE — 99214 OFFICE O/P EST MOD 30 MIN: CPT | Mod: 25,S$GLB,,

## 2022-02-28 PROCEDURE — 81025 URINE PREGNANCY TEST: CPT | Mod: S$GLB,,,

## 2022-02-28 PROCEDURE — 81003 POCT URINALYSIS, DIPSTICK, AUTOMATED, W/O SCOPE: ICD-10-PCS | Mod: QW,S$GLB,,

## 2022-02-28 PROCEDURE — 99214 PR OFFICE/OUTPT VISIT, EST, LEVL IV, 30-39 MIN: ICD-10-PCS | Mod: 25,S$GLB,,

## 2022-02-28 PROCEDURE — 74019 XR ABDOMEN FLAT AND ERECT: ICD-10-PCS | Mod: S$GLB,,, | Performed by: RADIOLOGY

## 2022-02-28 PROCEDURE — 96372 PR INJECTION,THERAP/PROPH/DIAG2ST, IM OR SUBCUT: ICD-10-PCS | Mod: S$GLB,,,

## 2022-02-28 PROCEDURE — 87086 URINE CULTURE/COLONY COUNT: CPT

## 2022-02-28 PROCEDURE — 74019 RADEX ABDOMEN 2 VIEWS: CPT | Mod: S$GLB,,, | Performed by: RADIOLOGY

## 2022-02-28 PROCEDURE — S0119 ONDANSETRON 4 MG: HCPCS | Mod: S$GLB,,,

## 2022-02-28 PROCEDURE — S0119 PR ONDANSETRON, ORAL, 4MG: ICD-10-PCS | Mod: S$GLB,,,

## 2022-02-28 PROCEDURE — 96372 THER/PROPH/DIAG INJ SC/IM: CPT | Mod: S$GLB,,,

## 2022-02-28 PROCEDURE — 81003 URINALYSIS AUTO W/O SCOPE: CPT | Mod: QW,S$GLB,,

## 2022-02-28 PROCEDURE — 81025 POCT URINE PREGNANCY: ICD-10-PCS | Mod: S$GLB,,,

## 2022-02-28 RX ORDER — ONDANSETRON 8 MG/1
8 TABLET, ORALLY DISINTEGRATING ORAL
Status: COMPLETED | OUTPATIENT
Start: 2022-02-28 | End: 2022-02-28

## 2022-02-28 RX ORDER — KETOROLAC TROMETHAMINE 30 MG/ML
30 INJECTION, SOLUTION INTRAMUSCULAR; INTRAVENOUS
Status: COMPLETED | OUTPATIENT
Start: 2022-02-28 | End: 2022-02-28

## 2022-02-28 RX ORDER — IBUPROFEN 600 MG/1
600 TABLET ORAL 3 TIMES DAILY
Qty: 30 TABLET | Refills: 0 | Status: SHIPPED | OUTPATIENT
Start: 2022-02-28

## 2022-02-28 RX ORDER — ONDANSETRON 4 MG/1
4 TABLET, FILM COATED ORAL 2 TIMES DAILY
Qty: 30 TABLET | Refills: 0 | Status: SHIPPED | OUTPATIENT
Start: 2022-02-28

## 2022-02-28 RX ORDER — TAMSULOSIN HYDROCHLORIDE 0.4 MG/1
0.4 CAPSULE ORAL DAILY
Qty: 30 CAPSULE | Refills: 0 | Status: SHIPPED | OUTPATIENT
Start: 2022-02-28 | End: 2022-08-10

## 2022-02-28 RX ADMIN — ONDANSETRON 8 MG: 8 TABLET, ORALLY DISINTEGRATING ORAL at 04:02

## 2022-02-28 RX ADMIN — KETOROLAC TROMETHAMINE 30 MG: 30 INJECTION, SOLUTION INTRAMUSCULAR; INTRAVENOUS at 04:02

## 2022-02-28 NOTE — PATIENT INSTRUCTIONS
- Rest.    - Drink plenty of fluids.    - Tylenol or Ibuprofen as directed as needed for fever/pain.    - If you were prescribed antibiotics, please take them to completion.  - If you are female and on birth control pills - please use additional methods of contraception to prevent pregnancy while on antibiotics and for one cycle after.   - If you were prescribed a narcotic medication or muscle relaxer, do not drive or operate heavy equipment or machinery while taking these medications, as they can cause drowsiness.   - If you smoke, please stop smoking.  -You must understand that you've received an Urgent Care treatment only and that you may be released before all your medical problems are known or treated. You, the patient, will    arrange for follow up care as instructed. Please arrange follow up with your primary medical clinic as soon as possible.   - Follow up with your PCP or specialty clinic as directed in the next 1-2 weeks if not improved or as needed.  You can call [phone] to schedule an appointment with the appropriate provider.    - Please return to Urgent Care or to the Emergency Department if your symptoms worsen.    - you received a shot of toradol in clinic today, so do not take ibuprofen until at least 24 hours from now.

## 2022-02-28 NOTE — PROGRESS NOTES
"Subjective:       Patient ID: Megan Navarro is a 37 y.o. female.    Vitals:  height is 5' 8" (1.727 m) and weight is 77.1 kg (170 lb). Her temperature is 98.2 °F (36.8 °C). Her blood pressure is 110/66 and her pulse is 96. Her respiration is 20 and oxygen saturation is 98%.     Chief Complaint: Vaginal Pain    Pt is a 38 y/o female who presents with BL flank pain and suprapubic pain that started today. Pain is constant and 7/10. Feels achy. Has not taken anything for pain. Reports hx of kidney stones about 1 year ago and states that this feels very similar. Nauseous and 2 episodes of NBNB emesis today. Denies fevers, CP, SOB, dizziness, dysuria, frequency, urgency, constipation, diarrhea, vaginal discharge or lesions. Hx of cholecystectomy.    Urinary Tract Infection   This is a new problem. The current episode started today. The problem has been unchanged. The quality of the pain is described as burning. The pain is at a severity of 7/10. The pain is moderate. There has been no fever. She is sexually active. Associated symptoms include flank pain, nausea and vomiting. Pertinent negatives include no behavior changes, chills, discharge, frequency, hematuria, hesitancy, possible pregnancy, sweats, urgency, weight loss, bubble bath use, constipation, rash or withholding. Associated symptoms comments: Feels, she has to urine but nothing is coming out.  . She has tried nothing for the symptoms.       Constitution: Negative for chills and fever.   HENT: Negative for ear pain, ear discharge, congestion and sore throat.    Neck: Negative for neck pain, neck stiffness and painful lymph nodes.   Cardiovascular: Negative for chest pain.   Eyes: Negative for eye itching, eye pain and eye redness.   Respiratory: Negative for chest tightness, cough and shortness of breath.    Gastrointestinal: Positive for abdominal pain, nausea and vomiting. Negative for constipation and diarrhea.   Genitourinary: Positive for flank pain. " Negative for dysuria, frequency, urgency and hematuria.   Musculoskeletal: Positive for back pain.   Skin: Negative for rash.   Neurological: Negative for dizziness, light-headedness and headaches.   Hematologic/Lymphatic: Negative for swollen lymph nodes.       Objective:      Physical Exam   Constitutional: She is oriented to person, place, and time. She appears well-developed.   HENT:   Head: Normocephalic and atraumatic.   Ears:   Right Ear: External ear normal.   Left Ear: External ear normal.   Nose: Nose normal.   Mouth/Throat: Mucous membranes are normal.   Eyes: Conjunctivae and lids are normal.   Neck: Trachea normal. Neck supple.   Cardiovascular: Normal rate, regular rhythm and normal heart sounds.   Pulmonary/Chest: Effort normal and breath sounds normal. No respiratory distress.   Abdominal: Normal appearance and bowel sounds are normal. She exhibits no distension, no abdominal bruit, no pulsatile midline mass and no mass. Soft. There is no abdominal tenderness. There is no rebound, no guarding, no tenderness at McBurney's point, no left CVA tenderness, negative Rovsing's sign, negative psoas sign, no right CVA tenderness and negative obturator sign.   Musculoskeletal: Normal range of motion.         General: Normal range of motion.   Neurological: She is alert and oriented to person, place, and time. She has normal strength.   Skin: Skin is warm, dry, intact, not diaphoretic and not pale.   Psychiatric: Her speech is normal and behavior is normal. Judgment and thought content normal.   Nursing note and vitals reviewed.    Results for orders placed or performed in visit on 02/28/22   POCT Urinalysis, Dipstick, Automated, W/O Scope   Result Value Ref Range    POC Blood, Urine Negative Negative    POC Bilirubin, Urine Negative Negative    POC Urobilinogen, Urine NORMAL 0.1 - 1.1    POC Ketones, Urine Positive (A) Negative    POC Protein, Urine Positive (A) Negative    POC Nitrates, Urine Negative  Negative    POC Glucose, Urine Negative Negative    pH, UA 7.0 5 - 8    POC Specific Gravity, Urine 1.015 1.003 - 1.029    POC Leukocytes, Urine Negative Negative   POCT urine pregnancy   Result Value Ref Range    POC Preg Test, Ur Negative Negative     Acceptable Yes          XR ABDOMEN FLAT AND ERECT    Result Date: 2/28/2022  EXAMINATION: XR ABDOMEN FLAT AND ERECT CLINICAL HISTORY: Unspecified abdominal pain TECHNIQUE: Flat and erect AP views of the abdomen were performed. COMPARISON: None. FINDINGS: There is a normal, nonobstructive bowel gas pattern.  There is no free intraperitoneal air.  There is a 5 mm calcification overlying the left renal fossa, possibly representing a renal stone.  Suspected similar lesion measuring 3 mm overlying the right renal fossa.  The visualized osseous structures are unremarkable. The visualized lung bases are clear.  There are right upper quadrant surgical clips.     Suspected bilateral nephrolithiasis. Nonobstructive bowel gas pattern. Electronically signed by: Tim Vences Date:    02/28/2022 Time:    17:24        Assessment:       1. Nephrolithiasis    2. Suprapubic pain    3. Non-intractable vomiting with nausea, unspecified vomiting type          Plan:         Nephrolithiasis  -     POCT Urinalysis, Dipstick, Automated, W/O Scope  -     POCT urine pregnancy  -     XR ABDOMEN FLAT AND ERECT; Future; Expected date: 02/28/2022  -     ketorolac injection 30 mg  -     Culture, Urine  -     Ambulatory referral/consult to Urology  -     ibuprofen (ADVIL,MOTRIN) 600 MG tablet; Take 1 tablet (600 mg total) by mouth 3 (three) times daily.  Dispense: 30 tablet; Refill: 0  -     tamsulosin (FLOMAX) 0.4 mg Cap; Take 1 capsule (0.4 mg total) by mouth once daily.  Dispense: 30 capsule; Refill: 0    Suprapubic pain  -     XR ABDOMEN FLAT AND ERECT; Future; Expected date: 02/28/2022  -     ketorolac injection 30 mg  -     Culture, Urine    Non-intractable vomiting with  nausea, unspecified vomiting type  -     ondansetron disintegrating tablet 8 mg  -     ondansetron (ZOFRAN) 4 MG tablet; Take 1 tablet (4 mg total) by mouth 2 (two) times daily.  Dispense: 30 tablet; Refill: 0            Medical Decision Making:   Initial Assessment:   Pt is a 38 y/o female who presents with BL flank pain and suprapubic pain that started today. Pain is constant and 7/10. Feels achy. Has not taken anything for pain. Reports hx of kidney stones about 1 year ago and states that this feels very similar. Nauseous and 2 episodes of NBNB emesis today. Denies fevers, CP, SOB, dizziness, dysuria, frequency, urgency, constipation, diarrhea, vaginal discharge or lesions. Hx of cholecystectomy. VSS. Afebrile. On exam, there is no abdominal tenderness. No peritoneal signs.  Differential Diagnosis:   DDx includes but not limited to UTI, pyelonephritis, kidney stones, appendicitis, ovarian torsion, ovarian cyst.  Clinical Tests:   Lab Tests: Ordered and Reviewed  The following lab test(s) were unremarkable: UPT       <> Summary of Lab: UA (+) ketones, protein  Radiological Study: Ordered and Reviewed  Urgent Care Management:  Reviewed UA, UPT, and XR with patient. XR shows BL nephrolithiasis. Given zofran for nausea and toradol in clinic for pain. Ibuprofen for pain. Urine culture sent and will call for results in 3-5 days and adjust management as necessary. Urology referral sent for pt. Return and ED precautions for new or worsening symptoms, such as fever or worsening pain. F/up with PCP and urology. Pt verbalizes understanding and agrees with plan.       Patient Instructions   - Rest.    - Drink plenty of fluids.    - Tylenol or Ibuprofen as directed as needed for fever/pain.    - If you were prescribed antibiotics, please take them to completion.  - If you are female and on birth control pills - please use additional methods of contraception to prevent pregnancy while on antibiotics and for one cycle after.    - If you were prescribed a narcotic medication or muscle relaxer, do not drive or operate heavy equipment or machinery while taking these medications, as they can cause drowsiness.   - If you smoke, please stop smoking.  -You must understand that you've received an Urgent Care treatment only and that you may be released before all your medical problems are known or treated. You, the patient, will    arrange for follow up care as instructed. Please arrange follow up with your primary medical clinic as soon as possible.   - Follow up with your PCP or specialty clinic as directed in the next 1-2 weeks if not improved or as needed.  You can call [phone] to schedule an appointment with the appropriate provider.    - Please return to Urgent Care or to the Emergency Department if your symptoms worsen.    - you received a shot of toradol in clinic today, so do not take ibuprofen until at least 24 hours from now.

## 2022-03-02 LAB — BACTERIA UR CULT: NO GROWTH

## 2022-03-03 ENCOUNTER — TELEPHONE (OUTPATIENT)
Dept: URGENT CARE | Facility: CLINIC | Age: 37
End: 2022-03-03
Payer: OTHER GOVERNMENT

## 2022-07-07 ENCOUNTER — LAB VISIT (OUTPATIENT)
Dept: LAB | Facility: HOSPITAL | Age: 37
End: 2022-07-07
Payer: OTHER GOVERNMENT

## 2022-07-07 ENCOUNTER — OFFICE VISIT (OUTPATIENT)
Dept: INTERNAL MEDICINE | Facility: CLINIC | Age: 37
End: 2022-07-07
Payer: OTHER GOVERNMENT

## 2022-07-07 VITALS
HEART RATE: 86 BPM | HEIGHT: 68 IN | WEIGHT: 178.13 LBS | DIASTOLIC BLOOD PRESSURE: 80 MMHG | BODY MASS INDEX: 27 KG/M2 | SYSTOLIC BLOOD PRESSURE: 120 MMHG

## 2022-07-07 DIAGNOSIS — Z13.29 SCREENING FOR THYROID DISORDER: ICD-10-CM

## 2022-07-07 DIAGNOSIS — Z00.00 ENCOUNTER FOR MEDICAL EXAMINATION TO ESTABLISH CARE: Primary | ICD-10-CM

## 2022-07-07 DIAGNOSIS — Z11.3 ROUTINE SCREENING FOR STI (SEXUALLY TRANSMITTED INFECTION): ICD-10-CM

## 2022-07-07 DIAGNOSIS — Z00.00 ENCOUNTER FOR ANNUAL PHYSICAL EXAM: ICD-10-CM

## 2022-07-07 DIAGNOSIS — Z13.1 SCREENING FOR DIABETES MELLITUS: ICD-10-CM

## 2022-07-07 DIAGNOSIS — Z00.00 ENCOUNTER FOR MEDICAL EXAMINATION TO ESTABLISH CARE: ICD-10-CM

## 2022-07-07 DIAGNOSIS — Z13.220 SCREENING CHOLESTEROL LEVEL: ICD-10-CM

## 2022-07-07 LAB
ALBUMIN SERPL BCP-MCNC: 4.2 G/DL (ref 3.5–5.2)
ALP SERPL-CCNC: 80 U/L (ref 55–135)
ALT SERPL W/O P-5'-P-CCNC: 43 U/L (ref 10–44)
ANION GAP SERPL CALC-SCNC: 13 MMOL/L (ref 8–16)
AST SERPL-CCNC: 46 U/L (ref 10–40)
BASOPHILS # BLD AUTO: 0.05 K/UL (ref 0–0.2)
BASOPHILS NFR BLD: 1 % (ref 0–1.9)
BILIRUB SERPL-MCNC: 0.6 MG/DL (ref 0.1–1)
BUN SERPL-MCNC: 9 MG/DL (ref 6–20)
CALCIUM SERPL-MCNC: 9.6 MG/DL (ref 8.7–10.5)
CHLORIDE SERPL-SCNC: 102 MMOL/L (ref 95–110)
CHOLEST SERPL-MCNC: 193 MG/DL (ref 120–199)
CHOLEST/HDLC SERPL: 1.8 {RATIO} (ref 2–5)
CO2 SERPL-SCNC: 24 MMOL/L (ref 23–29)
CREAT SERPL-MCNC: 0.8 MG/DL (ref 0.5–1.4)
DIFFERENTIAL METHOD: ABNORMAL
EOSINOPHIL # BLD AUTO: 0.1 K/UL (ref 0–0.5)
EOSINOPHIL NFR BLD: 2.4 % (ref 0–8)
ERYTHROCYTE [DISTWIDTH] IN BLOOD BY AUTOMATED COUNT: 13.5 % (ref 11.5–14.5)
EST. GFR  (AFRICAN AMERICAN): >60 ML/MIN/1.73 M^2
EST. GFR  (NON AFRICAN AMERICAN): >60 ML/MIN/1.73 M^2
ESTIMATED AVG GLUCOSE: 85 MG/DL (ref 68–131)
GLUCOSE SERPL-MCNC: 81 MG/DL (ref 70–110)
HBA1C MFR BLD: 4.6 % (ref 4–5.6)
HCT VFR BLD AUTO: 47.4 % (ref 37–48.5)
HDLC SERPL-MCNC: 110 MG/DL (ref 40–75)
HDLC SERPL: 57 % (ref 20–50)
HGB BLD-MCNC: 15.4 G/DL (ref 12–16)
IMM GRANULOCYTES # BLD AUTO: 0.01 K/UL (ref 0–0.04)
IMM GRANULOCYTES NFR BLD AUTO: 0.2 % (ref 0–0.5)
LDLC SERPL CALC-MCNC: 68.2 MG/DL (ref 63–159)
LYMPHOCYTES # BLD AUTO: 2.1 K/UL (ref 1–4.8)
LYMPHOCYTES NFR BLD: 42.3 % (ref 18–48)
MCH RBC QN AUTO: 34.5 PG (ref 27–31)
MCHC RBC AUTO-ENTMCNC: 32.5 G/DL (ref 32–36)
MCV RBC AUTO: 106 FL (ref 82–98)
MONOCYTES # BLD AUTO: 0.4 K/UL (ref 0.3–1)
MONOCYTES NFR BLD: 8.4 % (ref 4–15)
NEUTROPHILS # BLD AUTO: 2.3 K/UL (ref 1.8–7.7)
NEUTROPHILS NFR BLD: 45.7 % (ref 38–73)
NONHDLC SERPL-MCNC: 83 MG/DL
NRBC BLD-RTO: 0 /100 WBC
PLATELET # BLD AUTO: 354 K/UL (ref 150–450)
PMV BLD AUTO: 9.5 FL (ref 9.2–12.9)
POTASSIUM SERPL-SCNC: 4 MMOL/L (ref 3.5–5.1)
PROT SERPL-MCNC: 8.3 G/DL (ref 6–8.4)
RBC # BLD AUTO: 4.47 M/UL (ref 4–5.4)
SODIUM SERPL-SCNC: 139 MMOL/L (ref 136–145)
TRIGL SERPL-MCNC: 74 MG/DL (ref 30–150)
TSH SERPL DL<=0.005 MIU/L-ACNC: 1.04 UIU/ML (ref 0.4–4)
WBC # BLD AUTO: 5.01 K/UL (ref 3.9–12.7)

## 2022-07-07 PROCEDURE — 80053 COMPREHEN METABOLIC PANEL: CPT | Performed by: STUDENT IN AN ORGANIZED HEALTH CARE EDUCATION/TRAINING PROGRAM

## 2022-07-07 PROCEDURE — 80061 LIPID PANEL: CPT | Performed by: STUDENT IN AN ORGANIZED HEALTH CARE EDUCATION/TRAINING PROGRAM

## 2022-07-07 PROCEDURE — 99385 PR PREVENTIVE VISIT,NEW,18-39: ICD-10-PCS | Mod: S$PBB,,, | Performed by: STUDENT IN AN ORGANIZED HEALTH CARE EDUCATION/TRAINING PROGRAM

## 2022-07-07 PROCEDURE — 83036 HEMOGLOBIN GLYCOSYLATED A1C: CPT | Performed by: STUDENT IN AN ORGANIZED HEALTH CARE EDUCATION/TRAINING PROGRAM

## 2022-07-07 PROCEDURE — 99999 PR PBB SHADOW E&M-EST. PATIENT-LVL III: CPT | Mod: PBBFAC,,, | Performed by: STUDENT IN AN ORGANIZED HEALTH CARE EDUCATION/TRAINING PROGRAM

## 2022-07-07 PROCEDURE — 99999 PR PBB SHADOW E&M-EST. PATIENT-LVL III: ICD-10-PCS | Mod: PBBFAC,,, | Performed by: STUDENT IN AN ORGANIZED HEALTH CARE EDUCATION/TRAINING PROGRAM

## 2022-07-07 PROCEDURE — 99213 OFFICE O/P EST LOW 20 MIN: CPT | Mod: PBBFAC | Performed by: STUDENT IN AN ORGANIZED HEALTH CARE EDUCATION/TRAINING PROGRAM

## 2022-07-07 PROCEDURE — 84443 ASSAY THYROID STIM HORMONE: CPT | Performed by: STUDENT IN AN ORGANIZED HEALTH CARE EDUCATION/TRAINING PROGRAM

## 2022-07-07 PROCEDURE — 85025 COMPLETE CBC W/AUTO DIFF WBC: CPT | Performed by: STUDENT IN AN ORGANIZED HEALTH CARE EDUCATION/TRAINING PROGRAM

## 2022-07-07 PROCEDURE — 99385 PREV VISIT NEW AGE 18-39: CPT | Mod: S$PBB,,, | Performed by: STUDENT IN AN ORGANIZED HEALTH CARE EDUCATION/TRAINING PROGRAM

## 2022-07-07 PROCEDURE — 86592 SYPHILIS TEST NON-TREP QUAL: CPT | Performed by: STUDENT IN AN ORGANIZED HEALTH CARE EDUCATION/TRAINING PROGRAM

## 2022-07-07 PROCEDURE — 36415 COLL VENOUS BLD VENIPUNCTURE: CPT | Performed by: STUDENT IN AN ORGANIZED HEALTH CARE EDUCATION/TRAINING PROGRAM

## 2022-07-07 NOTE — PROGRESS NOTES
"[facility]  CLINIC NOTE    Patient Name: Megan Navarro  YOB: 1985    PRESENTING HISTORY       History of Present Illness:  Ms. Megan Navarro is a 37 y.o. female w/ a PMHx of cholecystectomy and rhinoplasty who presents for establish care visit. 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    Annual Exam:  CVD Risk Assessment: http://tools.acc.org/TLHCS-Sbdn-Rdxukdncy-Plus/#!/calculate/estimate/  Age: 37 y.o..  HTN: no  HLD (age > 40 year): no  Smoker: No  Statin: no  ASA: no    CKD: no  DM (age > 40 years): no  Systemic Inflammation (SLE, RA): no  Metabolic Syndrome: no  (Metabolic syndrome is characterized by the presence of at least three of the following conditions: elevated glucose level, central obesity, low HDL cholesterol level, elevated triglyceride level, and elevated blood pressure)    Depression: PHQ2 0 - 3 for each; "How often have you experienced the following over the past 2 weeks?  Little interest, pleasure while doing things: 0  Feeling down, depressed, hopeless: 0    Thyroid:   History of Thyroid Disease: no    American Thyroid Association and the American Association of Clinical Endocrinologists recommend measuring TSH in individuals at risk for hypothyroidism (personal history of autoimmune disease, neck radiation, or thyroid surgery); and considering screening adults 60 years and older.    Sexual History:   Pt is sexually active with one male partner.   Uses no for protection, but her  has had a vasectomy.   Has not recently been tested for STIs.   Has not tested positive for any STIs in the past.   Is interested in STI testing today.    Screening for Infectious Disease  STD (hayde sexually active women < 24 years): NA  HIV (MSM, IVDU annually): ordered  HCV (one time born [id number]): ordered    Screening for Substance Abuse:  Tobacco use:   Cigarette smoker: no   Vaping: no   Chewing tobacco: no  Alcohol use: 14 cocktails (vodka " tonics) per week.   Illicit Substance abuse:   Marijuana: no   Cocaine: no   Other Illicit drug use: no  Use of prescriptions not prescribed to patient: no  Vitamins/Supplements/Homeopathic meds/substances: no    Screening for Abuse:  Lives in a Apartment on the 9th floor that does not have stairs. Lives with her . Does not feel safe at home. She reports that her  has PTSD. He has reportedly never been physically abusive with her. She has urged him to get help for his PTSD, but he always declines. There is a gun in their home, she does not know where it is or how to use it because it is his gun. He has never threatened to use it on her. If she felt unsafe, she does have access to money and she would go stay in a hotel. Her family is in [place]. She does not have a large support system here. She does not want to answer any more questions about why she does not feel safe.    Screening for Cancer:   Cervical (every 3 years age 21-65): Last done 3 years ago and was unremarkable. Next due in 1 month when she has an appt to establish care with an OBGYN.    Vaccines:  Due for the following vaccines: no             Review of Systems   Constitutional: Negative for chills, fever, malaise/fatigue and weight loss.   HENT: Negative for congestion, sore throat and tinnitus.    Eyes: Negative for blurred vision, double vision and photophobia.   Respiratory: Negative for cough, sputum production and shortness of breath.    Cardiovascular: Negative for chest pain, palpitations and leg swelling.   Gastrointestinal: Negative for abdominal pain, constipation, diarrhea, heartburn, nausea and vomiting.   Genitourinary: Negative for dysuria, frequency and urgency.   Musculoskeletal: Negative for back pain, falls, myalgias and neck pain.   Skin: Negative for itching and rash.   Neurological: Negative for dizziness, weakness and headaches.   Endo/Heme/Allergies: Negative for environmental allergies. Does not bruise/bleed  "easily.   Psychiatric/Behavioral: Negative for depression, hallucinations, substance abuse and suicidal ideas. The patient is nervous/anxious.        OBJECTIVE:   Vital Signs:  Vitals:    07/07/22 0902   BP: 120/80   Pulse: 86   Weight: 80.8 kg (178 lb 2.1 oz)   Height: 5' 8" (1.727 m)       No results found for this or any previous visit (from the past 24 hour(s)).      Physical Exam  Constitutional:       General: She is not in acute distress.     Appearance: Normal appearance. She is not ill-appearing.   HENT:      Head: Normocephalic and atraumatic.      Right Ear: External ear normal.      Left Ear: External ear normal.      Nose: Nose normal.      Mouth/Throat:      Mouth: Mucous membranes are moist.      Pharynx: Oropharynx is clear. No posterior oropharyngeal erythema.   Eyes:      General: No scleral icterus.     Extraocular Movements: Extraocular movements intact.      Conjunctiva/sclera: Conjunctivae normal.   Neck:      Thyroid: No thyromegaly.      Vascular: No JVD.      Trachea: No tracheal deviation.   Cardiovascular:      Rate and Rhythm: Normal rate and regular rhythm.      Pulses: Normal pulses.      Heart sounds: Normal heart sounds. No murmur heard.  Pulmonary:      Effort: Pulmonary effort is normal. No respiratory distress.      Breath sounds: Normal breath sounds.   Chest:      Chest wall: No tenderness.   Abdominal:      General: Bowel sounds are normal. There is no distension.      Palpations: Abdomen is soft. There is no mass.      Tenderness: There is no abdominal tenderness.   Musculoskeletal:         General: No tenderness. Normal range of motion.      Cervical back: Normal range of motion and neck supple.      Right lower leg: No edema.      Left lower leg: No edema.   Lymphadenopathy:      Cervical: No cervical adenopathy.   Skin:     General: Skin is warm and dry.      Capillary Refill: Capillary refill takes less than 2 seconds.      Findings: No bruising, erythema or rash. "   Neurological:      General: No focal deficit present.      Mental Status: She is alert and oriented to person, place, and time. Mental status is at baseline.      Motor: No weakness.      Gait: Gait is intact. Gait normal.      Deep Tendon Reflexes: Reflexes are normal and symmetric.   Psychiatric:         Mood and Affect: Mood and affect normal.         Thought Content: Thought content normal.         Cognition and Memory: Memory normal.         ASSESSMENT & PLAN:     Megan was seen today for annual exam.    Diagnoses and all orders for this visit:    Encounter for medical examination to establish care  -     CBC Auto Differential; Future  -     Comprehensive Metabolic Panel; Future  -     Lipid Panel; Future  -     Hemoglobin A1C; Future  -     TSH; Future  -     RPR; Future  - Provided pt with information about alcohol use disorder and domestic violence given her reported alcohol use and     Encounter for annual physical exam  -     CBC Auto Differential; Future  -     Comprehensive Metabolic Panel; Future  -     Lipid Panel; Future  -     Hemoglobin A1C; Future  -     TSH; Future  -     RPR; Future    Routine screening for STI (sexually transmitted infection)  -     RPR; Future    Screening cholesterol level  -     Lipid Panel; Future    Screening for diabetes mellitus  -     Hemoglobin A1C; Future    Screening for thyroid disorder  -     TSH; Future            Discussed with Dr. Nahun Cruz  Will contact w/ lab results and will Follow up in about 1 year (around 7/7/2023), or if symptoms worsen or fail to improve.     Signed:    Judit Cardona M.D.  Internal Medicine PGY-3  07/07/2022 10:06 AM

## 2022-07-08 LAB — RPR SER QL: NORMAL

## 2022-08-10 ENCOUNTER — OFFICE VISIT (OUTPATIENT)
Dept: OBSTETRICS AND GYNECOLOGY | Facility: CLINIC | Age: 37
End: 2022-08-10
Payer: OTHER GOVERNMENT

## 2022-08-10 VITALS
SYSTOLIC BLOOD PRESSURE: 100 MMHG | DIASTOLIC BLOOD PRESSURE: 74 MMHG | HEIGHT: 68 IN | BODY MASS INDEX: 26.97 KG/M2 | WEIGHT: 177.94 LBS

## 2022-08-10 DIAGNOSIS — Z12.4 CERVICAL CANCER SCREENING: ICD-10-CM

## 2022-08-10 DIAGNOSIS — B35.4 TINEA CORPORIS: ICD-10-CM

## 2022-08-10 DIAGNOSIS — Z01.419 ENCOUNTER FOR WELL WOMAN EXAM WITH ROUTINE GYNECOLOGICAL EXAM: Primary | ICD-10-CM

## 2022-08-10 DIAGNOSIS — Z11.3 SCREEN FOR STD (SEXUALLY TRANSMITTED DISEASE): ICD-10-CM

## 2022-08-10 DIAGNOSIS — K59.09 CHRONIC CONSTIPATION: ICD-10-CM

## 2022-08-10 PROCEDURE — 99999 PR PBB SHADOW E&M-EST. PATIENT-LVL III: CPT | Mod: PBBFAC,,, | Performed by: OBSTETRICS & GYNECOLOGY

## 2022-08-10 PROCEDURE — 88175 CYTOPATH C/V AUTO FLUID REDO: CPT | Performed by: OBSTETRICS & GYNECOLOGY

## 2022-08-10 PROCEDURE — 99385 PREV VISIT NEW AGE 18-39: CPT | Mod: S$PBB,,, | Performed by: OBSTETRICS & GYNECOLOGY

## 2022-08-10 PROCEDURE — 87591 N.GONORRHOEAE DNA AMP PROB: CPT | Performed by: OBSTETRICS & GYNECOLOGY

## 2022-08-10 PROCEDURE — 87491 CHLMYD TRACH DNA AMP PROBE: CPT | Performed by: OBSTETRICS & GYNECOLOGY

## 2022-08-10 PROCEDURE — 99999 PR PBB SHADOW E&M-EST. PATIENT-LVL III: ICD-10-PCS | Mod: PBBFAC,,, | Performed by: OBSTETRICS & GYNECOLOGY

## 2022-08-10 PROCEDURE — 99213 OFFICE O/P EST LOW 20 MIN: CPT | Mod: PBBFAC | Performed by: OBSTETRICS & GYNECOLOGY

## 2022-08-10 PROCEDURE — 87624 HPV HI-RISK TYP POOLED RSLT: CPT | Performed by: OBSTETRICS & GYNECOLOGY

## 2022-08-10 PROCEDURE — 99385 PR PREVENTIVE VISIT,NEW,18-39: ICD-10-PCS | Mod: S$PBB,,, | Performed by: OBSTETRICS & GYNECOLOGY

## 2022-08-10 RX ORDER — CLOTRIMAZOLE AND BETAMETHASONE DIPROPIONATE 10; .64 MG/G; MG/G
CREAM TOPICAL
Qty: 15 G | Refills: 1 | Status: SHIPPED | OUTPATIENT
Start: 2022-08-10 | End: 2023-08-10

## 2022-08-10 RX ORDER — NAPROXEN 500 MG/1
500 TABLET ORAL 2 TIMES DAILY
COMMUNITY
Start: 2022-02-12

## 2022-08-10 RX ORDER — NYSTATIN 100000 [USP'U]/G
POWDER TOPICAL 2 TIMES DAILY
Qty: 15 G | Refills: 3 | Status: SHIPPED | OUTPATIENT
Start: 2022-08-10

## 2022-08-10 NOTE — ASSESSMENT & PLAN NOTE
Normal breast and pelvic exams except as noted. Pap and STD screening collected. Continue breast self awareness, recommend 30 minutes of exercise 5 times weekly. RTC 1 year for annual exam, sooner PRN.     Inguinal rash c/w topical fungal dermatitis. Mycostatin powder and Lotrisone cream sent to pharmacy for use PRN. Keep area clean and dry. No evidence of superimposed bacterial infection.

## 2022-08-10 NOTE — ASSESSMENT & PLAN NOTE
Patient struggles with lifelong constipation despite the use of stool softeners, Metamucil, and Miralax. Reports good hydration. Discussed dietary modifications, but will refer to GI.

## 2022-08-10 NOTE — PROGRESS NOTES
Chief Complaint: Annual exam/New Patient    Chief Complaint   Patient presents with    New Gynecologic Exam      New patient of Dr. Samayoa  Last pap & hpv 2018) nml per pt.   She is requesting STD testing done.        HPI:   37 y.o.  here today as a new patient to establish care and for annual exam. PMH includes depression/anxiety. Reports her cycles are monthly to month and a half, not heavy. In the last 6 months may be up to 6 weeks apart. Denies hot flashes or night sweats. Patient denies abnormal breast symptoms, denies FH of breast, uterine, ovarian, or colon cancer. Patient denies vaginal discharge, itching, irritation, odor, abdominal pain, urinary complaints, or change in bowel habits. She is currently sexually active. Currently using  vasectomy for birth control. She desires STD testing today. She has been vaccinated against COVID-19.    Has an itchy rash in the left groin that comes and goes. Has been treated with topical antifungals but usually comes back.    Works at a dialysis center, recently had a needle stick. Getting tested later today.     LMP Dates from Last 1 Encounters:   LMP: 2022     Pap: Needs, history of abnormal    Labs / Significant Studies    Lab Visit on 2022   Component Date Value Ref Range Status    WBC 2022 5.01  3.90 - 12.70 K/uL Final    RBC 2022 4.47  4.00 - 5.40 M/uL Final    Hemoglobin 2022 15.4  12.0 - 16.0 g/dL Final    Hematocrit 2022 47.4  37.0 - 48.5 % Final    MCV 2022 106 (A) 82 - 98 fL Final    MCH 2022 34.5 (A) 27.0 - 31.0 pg Final    MCHC 2022 32.5  32.0 - 36.0 g/dL Final    RDW 2022 13.5  11.5 - 14.5 % Final    Platelets 2022 354  150 - 450 K/uL Final    MPV 2022 9.5  9.2 - 12.9 fL Final    Immature Granulocytes 2022 0.2  0.0 - 0.5 % Final    Gran # (ANC) 2022 2.3  1.8 - 7.7 K/uL Final    Immature Grans (Abs) 2022 0.01  0.00 - 0.04 K/uL Final     Comment: Mild elevation in immature granulocytes is non specific and   can be seen in a variety of conditions including stress response,   acute inflammation, trauma and pregnancy. Correlation with other   laboratory and clinical findings is essential.      Lymph # 07/07/2022 2.1  1.0 - 4.8 K/uL Final    Mono # 07/07/2022 0.4  0.3 - 1.0 K/uL Final    Eos # 07/07/2022 0.1  0.0 - 0.5 K/uL Final    Baso # 07/07/2022 0.05  0.00 - 0.20 K/uL Final    nRBC 07/07/2022 0  0 /100 WBC Final    Gran % 07/07/2022 45.7  38.0 - 73.0 % Final    Lymph % 07/07/2022 42.3  18.0 - 48.0 % Final    Mono % 07/07/2022 8.4  4.0 - 15.0 % Final    Eosinophil % 07/07/2022 2.4  0.0 - 8.0 % Final    Basophil % 07/07/2022 1.0  0.0 - 1.9 % Final    Differential Method 07/07/2022 Automated   Final    Sodium 07/07/2022 139  136 - 145 mmol/L Final    Potassium 07/07/2022 4.0  3.5 - 5.1 mmol/L Final    Chloride 07/07/2022 102  95 - 110 mmol/L Final    CO2 07/07/2022 24  23 - 29 mmol/L Final    Glucose 07/07/2022 81  70 - 110 mg/dL Final    BUN 07/07/2022 9  6 - 20 mg/dL Final    Creatinine 07/07/2022 0.8  0.5 - 1.4 mg/dL Final    Calcium 07/07/2022 9.6  8.7 - 10.5 mg/dL Final    Total Protein 07/07/2022 8.3  6.0 - 8.4 g/dL Final    Albumin 07/07/2022 4.2  3.5 - 5.2 g/dL Final    Total Bilirubin 07/07/2022 0.6  0.1 - 1.0 mg/dL Final    Comment: For infants and newborns, interpretation of results should be based  on gestational age, weight and in agreement with clinical  observations.    Premature Infant recommended reference ranges:  Up to 24 hours.............<8.0 mg/dL  Up to 48 hours............<12.0 mg/dL  3-5 days..................<15.0 mg/dL  6-29 days.................<15.0 mg/dL      Alkaline Phosphatase 07/07/2022 80  55 - 135 U/L Final    AST 07/07/2022 46 (A) 10 - 40 U/L Final    ALT 07/07/2022 43  10 - 44 U/L Final    Anion Gap 07/07/2022 13  8 - 16 mmol/L Final    eGFR if African American 07/07/2022 >60.0  >60  mL/min/1.73 m^2 Final    eGFR if non African American 07/07/2022 >60.0  >60 mL/min/1.73 m^2 Final    Comment: Calculation used to obtain the estimated glomerular filtration  rate (eGFR) is the CKD-EPI equation.       Cholesterol 07/07/2022 193  120 - 199 mg/dL Final    Comment: The National Cholesterol Education Program (NCEP) has set the  following guidelines (reference ranges) for Cholesterol:  Optimal.....................<200 mg/dL  Borderline High.............200-239 mg/dL  High........................> or = 240 mg/dL      Triglycerides 07/07/2022 74  30 - 150 mg/dL Final    Comment: The National Cholesterol Education Program (NCEP) has set the  following guidelines (reference values) for triglycerides:  Normal......................<150 mg/dL  Borderline High.............150-199 mg/dL  High........................200-499 mg/dL      HDL 07/07/2022 110 (A) 40 - 75 mg/dL Final    Comment: The National Cholesterol Education Program (NCEP) has set the  following guidelines (reference values) for HDL Cholesterol:  Low...............<40 mg/dL  Optimal...........>60 mg/dL      LDL Cholesterol 07/07/2022 68.2  63.0 - 159.0 mg/dL Final    Comment: The National Cholesterol Education Program (NCEP) has set the  following guidelines (reference values) for LDL Cholesterol:  Optimal.......................<130 mg/dL  Borderline High...............130-159 mg/dL  High..........................160-189 mg/dL  Very High.....................>190 mg/dL      HDL/Cholesterol Ratio 07/07/2022 57.0 (A) 20.0 - 50.0 % Final    Total Cholesterol/HDL Ratio 07/07/2022 1.8 (A) 2.0 - 5.0 Final    Non-HDL Cholesterol 07/07/2022 83  mg/dL Final    Comment: Risk category and Non-HDL cholesterol goals:  Coronary heart disease (CHD)or equivalent (10-year risk of CHD >20%):  Non-HDL cholesterol goal     <130 mg/dL  Two or more CHD risk factors and 10-year risk of CHD <= 20%:  Non-HDL cholesterol goal     <160 mg/dL  0 to 1 CHD risk  factor:  Non-HDL cholesterol goal     <190 mg/dL      Hemoglobin A1C 07/07/2022 4.6  4.0 - 5.6 % Final    Comment: ADA Screening Guidelines:  5.7-6.4%  Consistent with prediabetes  >or=6.5%  Consistent with diabetes    High levels of fetal hemoglobin interfere with the HbA1C  assay. Heterozygous hemoglobin variants (HbS, HgC, etc)do  not significantly interfere with this assay.   However, presence of multiple variants may affect accuracy.      Estimated Avg Glucose 07/07/2022 85  68 - 131 mg/dL Final    TSH 07/07/2022 1.045  0.400 - 4.000 uIU/mL Final    RPR 07/07/2022 Non-reactive  Non-reactive Final   Office Visit on 02/28/2022   Component Date Value Ref Range Status    POC Blood, Urine 02/28/2022 Negative  Negative Final    POC Bilirubin, Urine 02/28/2022 Negative  Negative Final    POC Urobilinogen, Urine 02/28/2022 NORMAL  0.1 - 1.1 Final    POC Ketones, Urine 02/28/2022 Positive (A) Negative Final    100mg/dL    POC Protein, Urine 02/28/2022 Positive (A) Negative Final    30mg/dL    POC Nitrates, Urine 02/28/2022 Negative  Negative Final    POC Glucose, Urine 02/28/2022 Negative  Negative Final    pH, UA 02/28/2022 7.0  5 - 8 Final    POC Specific Gravity, Urine 02/28/2022 1.015  1.003 - 1.029 Final    POC Leukocytes, Urine 02/28/2022 Negative  Negative Final    POC Preg Test, Ur 02/28/2022 Negative  Negative Final     Acceptable 02/28/2022 Yes   Final    Urine Culture, Routine 02/28/2022 No growth   Final          Past Medical History:   Diagnosis Date    Anxiety     Depression      Past Surgical History:   Procedure Laterality Date    CHOLECYSTECTOMY      NOSE SURGERY         Current Outpatient Medications:     EScitalopram oxalate (LEXAPRO) 20 MG tablet, Take 20 mg by mouth once daily., Disp: , Rfl:     ibuprofen (ADVIL,MOTRIN) 600 MG tablet, Take 1 tablet (600 mg total) by mouth 3 (three) times daily., Disp: 30 tablet, Rfl: 0    LORazepam (ATIVAN) 0.5 MG tablet, ,  Disp: , Rfl:     naproxen (NAPROSYN) 500 MG tablet, Take 500 mg by mouth 2 (two) times daily., Disp: , Rfl:     ondansetron (ZOFRAN) 4 MG tablet, , Disp: , Rfl:     ondansetron (ZOFRAN) 4 MG tablet, Take 1 tablet (4 mg total) by mouth 2 (two) times daily., Disp: 30 tablet, Rfl: 0    ondansetron (ZOFRAN-ODT) 4 MG TbDL, Take 1 tablet (4 mg total) by mouth every 6 (six) hours as needed (nausea)., Disp: 12 tablet, Rfl: 0    propranoloL (INDERAL) 20 MG tablet, , Disp: , Rfl:     tamsulosin (FLOMAX) 0.4 mg Cap, Take 1 capsule (0.4 mg total) by mouth once daily., Disp: 30 capsule, Rfl: 0    clotrimazole-betamethasone 1-0.05% (LOTRISONE) cream, Apply to affected area 2 times daily, Disp: 15 g, Rfl: 1    nystatin (MYCOSTATIN) powder, Apply topically 2 (two) times daily., Disp: 15 g, Rfl: 3  Review of patient's allergies indicates:   Allergen Reactions    Amoxicillin Rash     OB History   No obstetric history on file.     Social History     Tobacco Use    Smoking status: Never Smoker    Smokeless tobacco: Never Used   Substance Use Topics    Alcohol use: Yes     Comment: soc    Drug use: Never     History reviewed. No pertinent family history.    Review of Systems   Negative except as in HPI     Physical Exam   Vitals:    08/10/22 0918   BP: 100/74     Body mass index is 27.05 kg/m².    Physical Exam  Constitutional:       General: She is not in acute distress.     Appearance: Normal appearance.   Genitourinary:      Vulva, bladder and urethral meatus normal.      No vaginal discharge or bleeding.        Right Adnexa: not tender, not full and no mass present.     Left Adnexa: not tender, not full and no mass present.     No cervical motion tenderness or lesion.      Uterus is not tender.      No uterine mass detected.     Uterus is anteverted.      Bladder is not tender.       Pelvic exam was performed with patient in the lithotomy position.   Breasts:      Right: No mass, nipple discharge, skin change,  tenderness, axillary adenopathy or supraclavicular adenopathy.      Left: No mass, nipple discharge, skin change, tenderness, axillary adenopathy or supraclavicular adenopathy.       HENT:      Head: Normocephalic and atraumatic.   Neck:      Thyroid: No thyroid mass, thyromegaly or thyroid tenderness.   Pulmonary:      Effort: Pulmonary effort is normal.   Abdominal:      General: Bowel sounds are normal. There is no distension.      Palpations: Abdomen is soft. There is no mass.      Tenderness: There is no abdominal tenderness. There is no guarding.   Musculoskeletal:         General: Normal range of motion.      Cervical back: Normal range of motion.   Lymphadenopathy:      Cervical: No cervical adenopathy.      Upper Body:      Right upper body: No supraclavicular, axillary or pectoral adenopathy.      Left upper body: No supraclavicular, axillary or pectoral adenopathy.   Neurological:      General: No focal deficit present.      Mental Status: She is alert and oriented to person, place, and time.   Skin:     General: Skin is warm and dry.   Psychiatric:         Mood and Affect: Mood normal.         Behavior: Behavior normal.         Thought Content: Thought content normal.         Judgment: Judgment normal.   Exam conducted with a chaperone present.          ASSESSMENT:   Annual Well Women Exam  Patient Active Problem List   Diagnosis    Encounter for well woman exam with routine gynecological exam    Chronic constipation     Health Maintenance Due   Topic Date Due    Hepatitis C Screening  Never done    Cervical Cancer Screening  Never done    COVID-19 Vaccine (1) Never done    HIV Screening  Never done    TETANUS VACCINE  Never done     Health Maintenance Topics with due status: Not Due       Topic Last Completion Date    Influenza Vaccine Not Due         PLAN:  Problem List Items Addressed This Visit        Renal/    Encounter for well woman exam with routine gynecological exam - Primary     Current Assessment & Plan     Normal breast and pelvic exams except as noted. Pap and STD screening collected. Continue breast self awareness, recommend 30 minutes of exercise 5 times weekly. RTC 1 year for annual exam, sooner PRN.     Inguinal rash c/w topical fungal dermatitis. Mycostatin powder and Lotrisone cream sent to pharmacy for use PRN. Keep area clean and dry. No evidence of superimposed bacterial infection.                GI    Chronic constipation    Current Assessment & Plan     Patient struggles with lifelong constipation despite the use of stool softeners, Metamucil, and Miralax. Reports good hydration. Discussed dietary modifications, but will refer to GI.            Relevant Orders    Ambulatory referral/consult to Gastroenterology      Other Visit Diagnoses     Cervical cancer screening        Relevant Orders    Liquid-Based Pap Smear, Screening    HPV High Risk Genotypes, PCR    Screen for STD (sexually transmitted disease)        Relevant Orders    C. trachomatis/N. gonorrhoeae by AMP DNA [facility]; Cervicovaginal    RPR    Tinea corporis        Relevant Medications    nystatin (MYCOSTATIN) powder    clotrimazole-betamethasone 1-0.05% (LOTRISONE) cream          Follow up in about 1 year (around 8/10/2023) for Annual.         Valeira Samayoa MD  Department of Obstetrics & Gynecology  [facility]

## 2022-08-11 LAB
C TRACH DNA SPEC QL NAA+PROBE: NOT DETECTED
N GONORRHOEA DNA SPEC QL NAA+PROBE: NOT DETECTED

## 2022-10-30 ENCOUNTER — TELEPHONE (OUTPATIENT)
Dept: ENDOSCOPY | Facility: HOSPITAL | Age: 37
End: 2022-10-30
Payer: OTHER GOVERNMENT

## 2023-01-05 ENCOUNTER — HOSPITAL ENCOUNTER (EMERGENCY)
Facility: OTHER | Age: 38
Discharge: HOME | End: 2023-01-05
Attending: EMERGENCY MEDICINE
Payer: OTHER GOVERNMENT

## 2023-01-05 VITALS
TEMPERATURE: 97 F | WEIGHT: 170 LBS | BODY MASS INDEX: 25.76 KG/M2 | RESPIRATION RATE: 18 BRPM | OXYGEN SATURATION: 95 % | HEIGHT: 68 IN | DIASTOLIC BLOOD PRESSURE: 78 MMHG | HEART RATE: 97 BPM | SYSTOLIC BLOOD PRESSURE: 119 MMHG

## 2023-01-05 DIAGNOSIS — R00.2 PALPITATIONS: ICD-10-CM

## 2023-01-05 DIAGNOSIS — R07.9 CHEST PAIN: ICD-10-CM

## 2023-01-05 LAB
ALBUMIN SERPL BCP-MCNC: 4.2 G/DL (ref 3.5–5.2)
ALP SERPL-CCNC: 49 U/L (ref 55–135)
ALT SERPL W/O P-5'-P-CCNC: 22 U/L (ref 10–44)
ANION GAP SERPL CALC-SCNC: 12 MMOL/L (ref 8–16)
AST SERPL-CCNC: 25 U/L (ref 10–40)
B-HCG UR QL: NEGATIVE
BACTERIA #/AREA URNS HPF: ABNORMAL /HPF
BASOPHILS # BLD AUTO: 0.03 K/UL (ref 0–0.2)
BASOPHILS NFR BLD: 0.4 % (ref 0–1.9)
BILIRUB SERPL-MCNC: 0.5 MG/DL (ref 0.1–1)
BILIRUB UR QL STRIP: NEGATIVE
BUN SERPL-MCNC: 15 MG/DL (ref 6–20)
CALCIUM SERPL-MCNC: 9.9 MG/DL (ref 8.7–10.5)
CHLORIDE SERPL-SCNC: 101 MMOL/L (ref 95–110)
CLARITY UR: CLEAR
CO2 SERPL-SCNC: 21 MMOL/L (ref 23–29)
COLOR UR: YELLOW
CREAT SERPL-MCNC: 0.7 MG/DL (ref 0.5–1.4)
CTP QC/QA: YES
D DIMER PPP IA.FEU-MCNC: 0.27 MG/L FEU
DIFFERENTIAL METHOD: ABNORMAL
EOSINOPHIL # BLD AUTO: 0 K/UL (ref 0–0.5)
EOSINOPHIL NFR BLD: 0 % (ref 0–8)
ERYTHROCYTE [DISTWIDTH] IN BLOOD BY AUTOMATED COUNT: 12.2 % (ref 11.5–14.5)
EST. GFR  (NO RACE VARIABLE): >60 ML/MIN/1.73 M^2
GLUCOSE SERPL-MCNC: 73 MG/DL (ref 70–110)
GLUCOSE UR QL STRIP: NEGATIVE
GRAN CASTS #/AREA URNS LPF: 6 /LPF
HCT VFR BLD AUTO: 39.7 % (ref 37–48.5)
HCV AB SERPL QL IA: NEGATIVE
HGB BLD-MCNC: 13.8 G/DL (ref 12–16)
HGB UR QL STRIP: NEGATIVE
HIV 1+2 AB+HIV1 P24 AG SERPL QL IA: NEGATIVE
HYALINE CASTS #/AREA URNS LPF: 9 /LPF
IMM GRANULOCYTES # BLD AUTO: 0.02 K/UL (ref 0–0.04)
IMM GRANULOCYTES NFR BLD AUTO: 0.3 % (ref 0–0.5)
KETONES UR QL STRIP: ABNORMAL
LEUKOCYTE ESTERASE UR QL STRIP: NEGATIVE
LYMPHOCYTES # BLD AUTO: 1.1 K/UL (ref 1–4.8)
LYMPHOCYTES NFR BLD: 14.6 % (ref 18–48)
MAGNESIUM SERPL-MCNC: 1.4 MG/DL (ref 1.6–2.6)
MCH RBC QN AUTO: 34.1 PG (ref 27–31)
MCHC RBC AUTO-ENTMCNC: 34.8 G/DL (ref 32–36)
MCV RBC AUTO: 98 FL (ref 82–98)
MICROSCOPIC COMMENT: ABNORMAL
MONOCYTES # BLD AUTO: 0.5 K/UL (ref 0.3–1)
MONOCYTES NFR BLD: 6 % (ref 4–15)
NEUTROPHILS # BLD AUTO: 5.9 K/UL (ref 1.8–7.7)
NEUTROPHILS NFR BLD: 78.7 % (ref 38–73)
NITRITE UR QL STRIP: NEGATIVE
NRBC BLD-RTO: 0 /100 WBC
PH UR STRIP: 5 [PH] (ref 5–8)
PLATELET # BLD AUTO: 254 K/UL (ref 150–450)
PMV BLD AUTO: 9.2 FL (ref 9.2–12.9)
POTASSIUM SERPL-SCNC: 4.1 MMOL/L (ref 3.5–5.1)
PROT SERPL-MCNC: 7.6 G/DL (ref 6–8.4)
PROT UR QL STRIP: ABNORMAL
RBC # BLD AUTO: 4.05 M/UL (ref 4–5.4)
RBC #/AREA URNS HPF: 3 /HPF (ref 0–4)
SODIUM SERPL-SCNC: 134 MMOL/L (ref 136–145)
SP GR UR STRIP: 1.02 (ref 1–1.03)
SQUAMOUS #/AREA URNS HPF: 6 /HPF
TROPONIN I SERPL DL<=0.01 NG/ML-MCNC: <0.006 NG/ML (ref 0–0.03)
TSH SERPL DL<=0.005 MIU/L-ACNC: 0.51 UIU/ML (ref 0.4–4)
URN SPEC COLLECT METH UR: ABNORMAL
UROBILINOGEN UR STRIP-ACNC: NEGATIVE EU/DL
WBC # BLD AUTO: 7.53 K/UL (ref 3.9–12.7)
WBC #/AREA URNS HPF: 3 /HPF (ref 0–5)

## 2023-01-05 PROCEDURE — 96361 HYDRATE IV INFUSION ADD-ON: CPT

## 2023-01-05 PROCEDURE — 87389 HIV-1 AG W/HIV-1&-2 AB AG IA: CPT | Performed by: EMERGENCY MEDICINE

## 2023-01-05 PROCEDURE — 96374 THER/PROPH/DIAG INJ IV PUSH: CPT

## 2023-01-05 PROCEDURE — 81025 URINE PREGNANCY TEST: CPT | Performed by: EMERGENCY MEDICINE

## 2023-01-05 PROCEDURE — 93010 ELECTROCARDIOGRAM REPORT: CPT | Mod: ,,, | Performed by: INTERNAL MEDICINE

## 2023-01-05 PROCEDURE — 81000 URINALYSIS NONAUTO W/SCOPE: CPT | Performed by: EMERGENCY MEDICINE

## 2023-01-05 PROCEDURE — 85025 COMPLETE CBC W/AUTO DIFF WBC: CPT | Performed by: EMERGENCY MEDICINE

## 2023-01-05 PROCEDURE — 96375 TX/PRO/DX INJ NEW DRUG ADDON: CPT

## 2023-01-05 PROCEDURE — 83735 ASSAY OF MAGNESIUM: CPT | Performed by: EMERGENCY MEDICINE

## 2023-01-05 PROCEDURE — 86803 HEPATITIS C AB TEST: CPT | Performed by: EMERGENCY MEDICINE

## 2023-01-05 PROCEDURE — 99285 EMERGENCY DEPT VISIT HI MDM: CPT | Mod: 25

## 2023-01-05 PROCEDURE — 93010 EKG 12-LEAD: ICD-10-PCS | Mod: ,,, | Performed by: INTERNAL MEDICINE

## 2023-01-05 PROCEDURE — 63600175 PHARM REV CODE 636 W HCPCS: Performed by: EMERGENCY MEDICINE

## 2023-01-05 PROCEDURE — 25000003 PHARM REV CODE 250: Performed by: EMERGENCY MEDICINE

## 2023-01-05 PROCEDURE — 84484 ASSAY OF TROPONIN QUANT: CPT | Performed by: EMERGENCY MEDICINE

## 2023-01-05 PROCEDURE — 93005 ELECTROCARDIOGRAM TRACING: CPT

## 2023-01-05 PROCEDURE — 80053 COMPREHEN METABOLIC PANEL: CPT | Performed by: EMERGENCY MEDICINE

## 2023-01-05 PROCEDURE — 85379 FIBRIN DEGRADATION QUANT: CPT | Performed by: EMERGENCY MEDICINE

## 2023-01-05 PROCEDURE — 84443 ASSAY THYROID STIM HORMONE: CPT | Performed by: EMERGENCY MEDICINE

## 2023-01-05 RX ORDER — LORAZEPAM 2 MG/ML
0.5 INJECTION INTRAMUSCULAR
Status: COMPLETED | OUTPATIENT
Start: 2023-01-05 | End: 2023-01-05

## 2023-01-05 RX ORDER — ONDANSETRON 2 MG/ML
4 INJECTION INTRAMUSCULAR; INTRAVENOUS
Status: COMPLETED | OUTPATIENT
Start: 2023-01-05 | End: 2023-01-05

## 2023-01-05 RX ORDER — LANOLIN ALCOHOL/MO/W.PET/CERES
400 CREAM (GRAM) TOPICAL
Status: COMPLETED | OUTPATIENT
Start: 2023-01-05 | End: 2023-01-05

## 2023-01-05 RX ORDER — ONDANSETRON 2 MG/ML
4 INJECTION INTRAMUSCULAR; INTRAVENOUS
Status: DISCONTINUED | OUTPATIENT
Start: 2023-01-05 | End: 2023-01-05 | Stop reason: HOSPADM

## 2023-01-05 RX ADMIN — ONDANSETRON 4 MG: 2 INJECTION INTRAMUSCULAR; INTRAVENOUS at 07:01

## 2023-01-05 RX ADMIN — Medication 400 MG: at 09:01

## 2023-01-05 RX ADMIN — SODIUM CHLORIDE 1000 ML: 0.9 INJECTION, SOLUTION INTRAVENOUS at 07:01

## 2023-01-05 RX ADMIN — LORAZEPAM 0.5 MG: 2 INJECTION INTRAMUSCULAR; INTRAVENOUS at 07:01

## 2023-01-05 NOTE — Clinical Note
"Megan"Rachel Nvaarro was seen and treated in our emergency department on 1/5/2023.  She may return to work on 01/07/2023.       If you have any questions or concerns, please don't hesitate to call.      Earl Corral MD"

## 2023-01-05 NOTE — ED PROVIDER NOTES
Encounter Date: 1/5/2023    SCRIBE #1 NOTE: I, Alokkenya Jeronimo, am scribing for, and in the presence of,  Earl Corral MD.     History     Chief Complaint   Patient presents with    Chest Pain     Reports intermittent non-radiating mid/left sided chest pain (pressure), onset 0150 this am. Patient was awaken by pain with SOB/dizziness/nausea. Pain 7/10 at this time, was 9/10 at worst. Hx of anxiety/depression     Time seen by provider: 7:15AM    This is a 37 y.o. female with a PMHx of anxiety and depression, presents to the ED with chest pain. Patient started feeling CP, shortness of breath  and palpations earlier this morning that woke her up. She went to work this morning and her symptoms did not improve, checked her heart rate and it was 150 so came to ED. She also felt nauseous and vomited once on arrival here, but denies any cough or wheezing. She states that she has had similar episodes in the past month with no clear pattern or precipitant, occurring a few times a day lasting 2 hours at a time, but then not occurring for another week; episodes resolve without intervention. Patient admits to taking her prescribed medication of Ativan once daily at night to aid with sleep, but also notes that her previous episodes have been improved when taking it. She also notes occasional chest pain and palpitations with activity over the past few months. No leg swelling, vomiting, or other complaints    The history is provided by the patient.   Review of patient's allergies indicates:   Allergen Reactions    Amoxicillin Rash     Past Medical History:   Diagnosis Date    Anxiety     Depression      Past Surgical History:   Procedure Laterality Date    CHOLECYSTECTOMY      NOSE SURGERY       No family history on file.  Social History     Tobacco Use    Smoking status: Never    Smokeless tobacco: Never   Substance Use Topics    Alcohol use: Yes     Comment: soc    Drug use: Never     Review of Systems   Constitutional:   Negative for fever.   HENT:  Negative for sore throat.    Respiratory:  Positive for chest tightness and shortness of breath.    Cardiovascular:  Positive for palpitations. Negative for leg swelling.   Gastrointestinal:  Positive for nausea and vomiting.   Genitourinary:  Negative for dysuria.   Musculoskeletal:  Negative for back pain.   Skin:  Negative for rash.   Neurological:  Negative for weakness.   Hematological:  Does not bruise/bleed easily.   Psychiatric/Behavioral:  The patient is nervous/anxious.      Physical Exam     Initial Vitals [01/05/23 0606]   BP Pulse Resp Temp SpO2   126/78 (!) 121 18 97.1 °F (36.2 °C) 100 %      MAP       --         Physical Exam    Nursing note and vitals reviewed.  Constitutional: She appears well-developed and well-nourished. She is not diaphoretic. No distress.   HENT:   Head: Normocephalic and atraumatic.   Dry mucous membranes   Eyes: Conjunctivae are normal. No scleral icterus.   Neck: Neck supple.   Cardiovascular:  Regular rhythm, normal heart sounds and intact distal pulses.           No murmur heard.  Tachycardiac   Pulmonary/Chest: Breath sounds normal. No respiratory distress. She has no wheezes. She has no rhonchi. She has no rales. She exhibits no tenderness.   Abdominal: Abdomen is soft. She exhibits no mass. There is no abdominal tenderness. There is no rebound and no guarding.   Musculoskeletal:         General: No edema.      Cervical back: Neck supple.     Neurological: She is alert and oriented to person, place, and time. She has normal strength. No cranial nerve deficit.   Skin: Skin is warm and dry. No rash noted.   Psychiatric: She has a normal mood and affect. Thought content normal.       ED Course   Procedures  Labs Reviewed   CBC W/ AUTO DIFFERENTIAL - Abnormal; Notable for the following components:       Result Value    MCH 34.1 (*)     Gran % 78.7 (*)     Lymph % 14.6 (*)     All other components within normal limits   COMPREHENSIVE METABOLIC  PANEL - Abnormal; Notable for the following components:    Sodium 134 (*)     CO2 21 (*)     Alkaline Phosphatase 49 (*)     All other components within normal limits   URINALYSIS, REFLEX TO URINE CULTURE - Abnormal; Notable for the following components:    Protein, UA 1+ (*)     Ketones, UA 3+ (*)     All other components within normal limits    Narrative:     Specimen Source->Urine   MAGNESIUM - Abnormal; Notable for the following components:    Magnesium 1.4 (*)     All other components within normal limits   URINALYSIS MICROSCOPIC - Abnormal; Notable for the following components:    Hyaline Casts, UA 9 (*)     Granular Casts, UA 6 (*)     All other components within normal limits    Narrative:     Specimen Source->Urine   HIV 1 / 2 ANTIBODY    Narrative:     Release to patient->Immediate   HEPATITIS C ANTIBODY    Narrative:     Release to patient->Immediate   D DIMER, QUANTITATIVE   TROPONIN I   TSH   POCT URINE PREGNANCY     EKG Readings: (Independently Interpreted)   Rhythm: Sinus Tachycardia.   Rate 126. No acute arrhythmia or ischemia. No previous.      Imaging Results              X-Ray Chest AP Portable (Final result)  Result time 01/05/23 07:54:47      Final result by Titus Bennett MD (01/05/23 07:54:47)                   Impression:      As above.      Electronically signed by: Titus Bennett MD  Date:    01/05/2023  Time:    07:54               Narrative:    EXAMINATION:  XR CHEST AP PORTABLE    CLINICAL HISTORY:  Palpitations    TECHNIQUE:  Single frontal view of the chest was performed.    FINDINGS:  The lungs are clear.  There is no pneumothorax or pleural fluid.  The cardiac silhouette is not enlarged.  The osseous structures are unremarkable.                                       Medications   sodium chloride 0.9% bolus 1,000 mL 1,000 mL (0 mLs Intravenous Stopped 1/5/23 0903)   ondansetron injection 4 mg (4 mg Intravenous Given 1/5/23 4948)   LORazepam injection 0.5 mg (0.5 mg Intravenous Given  "1/5/23 4655)   magnesium oxide tablet 400 mg (400 mg Oral Given 1/5/23 9016)     Medical Decision Making:   Initial Assessment:       37-year-old female with history of anxiety/depression presents for evaluation of persistent palpitations, chest pressure.  These symptoms woke her up at 2:00 a.m. and have been persistent since then; she tried going to work but her heart rate was reportedly 150 so she came here.  She is had similar episodes over the past month and a half, no clear precipitant per patient, can happen during the day or overnight, usually lasting a few hours and resolved with "calming down".  This is somewhat different than her typical panic attack symptoms where she has these symptoms with associated throat tightness and feeling of doom, extremity tingling.  These episodes consist of fast heart rate, chest pressure, and SOB, and can trigger her other anxiety symptoms.  No increase in stress or suspected precipitant for worsening anxiety.  She did have nausea and 1 episode of emesis on arrival here, but no URI symptoms, she has been eating and drinking normally.  She also notes occasional palpitations, CP/SOB with exertion over the past few months.  No leg swelling or known DVT/PE risk factors, no OCP use or recent travel.  No known cardiac risk factors.  On arrival patient with heart rate of 121 in triage, that improved to the 100s by time of my exam, in sinus rhythm.  Exam otherwise reassuring with no O2 sat on room air, no lower extremity edema or other concerning findings. Initial EKG with sinus tachycardia, no sign of acute ischemia, no previous to compare.    Given history of anxiety, symptoms most likely related to breakthrough anxiety and atypical panic attacks; she does take Ativan nightly to sleep, and has taken during these episodes with some improvement.  However, given some exertional CP/SOB would also consider less likely cardiopulmonary etiologies such as ACS, PE, or less likely anemia.  " Will check labs to rule out these other disabilities, continue to monitor.    Basic labs with no acute findings, including no anemia, negative D-dimer and troponin, normal TSH, and no TOMER or electrolyte abnormality.  Chest x-ray also unremarkable.  Patient remained with normal O2 sat throughout ED stay, no indication for PE imaging.  She was treated with Zofran, Ativan, IVF and on reassessment she feels much better, resting comfortably with improved tachycardia.  Given reassuring workup, I feel she is stable for discharge with cardiology referral to evaluate exertional symptoms, and further management of anxiety per her PCP.  She is advised to continue current medications, and consider taking an extra dose of Ativan at home if she has a recurrent episode, will also Rx Zofran for any nausea.  She also understands return to the ED for any worsening palpitations, chest pain, difficulty breathing, or any other new concerning symptoms.      Independently Interpreted Test(s):   I have ordered and independently interpreted X-rays - see prior notes.  I have ordered and independently interpreted EKG Reading(s) - see prior notes  Clinical Tests:   Lab Tests: Ordered and Reviewed  Radiological Study: Ordered and Reviewed  Medical Tests: Ordered and Reviewed        Scribe Attestation:   Scribe #1: I performed the above scribed service and the documentation accurately describes the services I performed. I attest to the accuracy of the note.            I, Dr. Earl Corral, personally performed the services described in this documentation. All medical record entries made by the scribe were at my direction and in my presence.  I have reviewed the chart and agree that the record reflects my personal performance and is accurate and complete. Earl Corral MD.          Clinical Impression:   Final diagnoses:  [R07.9] Chest pain  [R00.2] Palpitations        ED Disposition Condition    Discharge Stable          ED  Prescriptions    None       Follow-up Information       Follow up With Specialties Details Why Contact Info Additional Information    [facility] Cardiology Schedule an appointment as soon as possible for a visit in 3 days  [address], [address]  [facility] [address] [phone] [facility], 6th [address], [address] Please park in [place] and use [place] elevators    [facility] - Emergency Dept Emergency Medicine Go to  If symptoms worsen [address] [facility] [address] [phone]              Earl Corral MD  01/05/23 2372

## 2023-01-05 NOTE — ED NOTES
Pt ambulatory to the restroom independently; steady gait noted. Pt returned to room 04 without incident. Pt replaced on continuous cardiac and pulse ox monitoring with non-invasion blood pressure to cycle every 60 minutes.  Bed locked in lowest position; side rails up and locked x 2; call light, bedside table, and personal belongings within reach. Pt instructed to alert nurse for assistance and before attempting to get out of bed; verbalizes understanding. Will continue to monitor pt.

## 2023-02-03 ENCOUNTER — HOSPITAL ENCOUNTER (EMERGENCY)
Facility: HOSPITAL | Age: 38
Discharge: HOME | End: 2023-02-03
Attending: EMERGENCY MEDICINE
Payer: OTHER GOVERNMENT

## 2023-02-03 VITALS
RESPIRATION RATE: 18 BRPM | HEIGHT: 68 IN | SYSTOLIC BLOOD PRESSURE: 120 MMHG | DIASTOLIC BLOOD PRESSURE: 72 MMHG | OXYGEN SATURATION: 100 % | TEMPERATURE: 98 F | WEIGHT: 170 LBS | HEART RATE: 90 BPM | BODY MASS INDEX: 25.76 KG/M2

## 2023-02-03 DIAGNOSIS — M79.604 RIGHT LEG PAIN: Primary | ICD-10-CM

## 2023-02-03 DIAGNOSIS — M25.569 KNEE PAIN: ICD-10-CM

## 2023-02-03 LAB
BUN SERPL-MCNC: 10 MG/DL (ref 6–30)
CHLORIDE SERPL-SCNC: 106 MMOL/L (ref 95–110)
CREAT SERPL-MCNC: 1 MG/DL (ref 0.5–1.4)
GLUCOSE SERPL-MCNC: 92 MG/DL (ref 70–110)
HCT VFR BLD CALC: 46 %PCV (ref 36–54)
POC IONIZED CALCIUM: 0.88 MMOL/L (ref 1.06–1.42)
POC TCO2 (MEASURED): 24 MMOL/L (ref 23–29)
POTASSIUM BLD-SCNC: 4 MMOL/L (ref 3.5–5.1)
SAMPLE: ABNORMAL
SODIUM BLD-SCNC: 140 MMOL/L (ref 136–145)

## 2023-02-03 PROCEDURE — 99283 EMERGENCY DEPT VISIT LOW MDM: CPT | Mod: ,,, | Performed by: EMERGENCY MEDICINE

## 2023-02-03 PROCEDURE — 80047 BASIC METABLC PNL IONIZED CA: CPT

## 2023-02-03 PROCEDURE — 99283 PR EMERGENCY DEPT VISIT,LEVEL III: ICD-10-PCS | Mod: ,,, | Performed by: EMERGENCY MEDICINE

## 2023-02-03 PROCEDURE — 99285 EMERGENCY DEPT VISIT HI MDM: CPT | Mod: 25

## 2023-02-03 NOTE — ED NOTES
"Patient identifiers verified and correct for  Ms Navarro  C/C: Right calf pain SEE NN  APPEARANCE: awake and alert in NAD. PAIN  0/10  SKIN: warm, dry and intact. No breakdown or bruising.  MUSCULOSKELETAL: Patient moving all extremities spontaneously, no obvious swelling or deformities noted. Ambulates independently.  RESPIRATORY: Denies shortness of breath.Respirations unlabored.   CARDIAC: Denies CP, 2+ distal pulses; no peripheral edema  ABDOMEN: S/ND/NT, Denies nausea  : voids spontaneously, denies difficulty  Neurologic: AAO x 4; follows commands equal strength in all extremities; denies numbness/tingling. Denies dizziness  Denies weakness reports " shooting pain" with walking   "

## 2023-02-03 NOTE — ED NOTES
Patient states pain to left inner calf x 2 days, denies SOB States pain worse with walking severalmiles today

## 2023-02-03 NOTE — ED PROVIDER NOTES
Encounter Date: 2/3/2023    SCRIBE #1 NOTE: I, Ricarda Schaefer, am scribing for, and in the presence of,  Megan Brady MD. I have scribed the following portions of the note - Other sections scribed: HPI, ROS, PE.     History     Chief Complaint   Patient presents with    Leg Pain     Right leg pain, concerned possible DVT     37 y.o. female with a PMHx of anxiety, who presents to the ED c/o right posterior knee and leg pain. Patient states she was walking earlier today when she suddenly felt a sharp pain to her posterior leg that shot up to the posterior knee. She reports waking up normal this morning. Pt indicates swelling to the right knee. She notes recently hitting her knee at work on a sink and reporting swelling since. She denies SOB, chest pain, fever and/or chills. Patient is currently on Zofran, ativan and lexapro.     The history is provided by medical records and the patient. No  was used.   Review of patient's allergies indicates:   Allergen Reactions    Amoxicillin Rash     Past Medical History:   Diagnosis Date    Anxiety     Depression      Past Surgical History:   Procedure Laterality Date    CHOLECYSTECTOMY      NOSE SURGERY       History reviewed. No pertinent family history.  Social History     Tobacco Use    Smoking status: Never    Smokeless tobacco: Never   Substance Use Topics    Alcohol use: Yes     Comment: soc    Drug use: Never     Review of Systems   Constitutional:  Negative for fever.   HENT:  Negative for sore throat.    Respiratory:  Negative for shortness of breath.    Cardiovascular:  Negative for chest pain and leg swelling.   Gastrointestinal:  Negative for nausea.   Genitourinary:  Negative for dysuria.   Musculoskeletal:  Negative for back pain.        +posterior right knee and leg pain +right knee swelling   Skin:  Negative for rash.   Neurological:  Negative for weakness.   Hematological:  Does not bruise/bleed easily.     Physical Exam     Initial Vitals  [02/03/23 1639]   BP Pulse Resp Temp SpO2   (!) 153/85 (!) 114 15 97.3 °F (36.3 °C) 98 %      MAP       --         Physical Exam    Constitutional: Vital signs are normal. She appears well-developed and well-nourished. She is not diaphoretic.  Non-toxic appearance. She does not appear ill. No distress.   HENT:   Head: Normocephalic and atraumatic.   Mouth/Throat: Mucous membranes are normal.   Eyes: Lids are normal.   Neck: Neck supple.   Cardiovascular:  Normal rate.           Musculoskeletal:      Cervical back: Neck supple.      Comments: Right lower extremity appears normal, there is no significant bony tenderness to the knee, her seems to be full range of motion without limitation, no area of swelling or deformity.    Patient has some mild tenderness to the popliteal fossa and posterior calf    No swelling to the lower leg, no area of erythema, induration, discoloration  Dorsalis pedis pulses 2+     Neurological: She is alert and oriented to person, place, and time. She has normal strength. No cranial nerve deficit.   Skin: Skin is warm, dry and intact. No rash noted. No cyanosis. No pallor.   Psychiatric: She has a normal mood and affect. Her speech is normal and behavior is normal. She is not actively hallucinating. She is attentive.       ED Course   Procedures  Labs Reviewed   ISTAT PROCEDURE - Abnormal; Notable for the following components:       Result Value    POC Ionized Calcium 0.88 (*)     All other components within normal limits   ISTAT CHEM8          Imaging Results              US Lower Extremity Veins Bilateral (Final result)  Result time 02/03/23 19:40:29      Final result by Sigifredo Castro MD (02/03/23 19:40:29)                   Impression:      No evidence of deep venous thrombosis in either lower extremity.    Electronically signed by resident: Valeria Newsome  Date:    02/03/2023  Time:    19:28    Electronically signed by: Sigifredo Castro MD  Date:    02/03/2023  Time:    19:40                Narrative:    EXAMINATION:  US LOWER EXTREMITY VEINS BILATERAL    CLINICAL HISTORY:  Pain in right leg    TECHNIQUE:  Duplex and color flow Doppler and dynamic compression was performed of the bilateral lower extremity veins was performed.    COMPARISON:  None    FINDINGS:  Right thigh veins: The common femoral, femoral, popliteal, upper greater saphenous, and deep femoral veins are patent and free of thrombus. The veins are normally compressible and have normal phasic flow and augmentation response.    Right calf veins: The visualized calf veins are patent.    Left thigh veins: The common femoral, femoral, popliteal, upper greater saphenous, and deep femoral veins are patent and free of thrombus. The veins are normally compressible and have normal phasic flow and augmentation response.    Left calf veins: The visualized calf veins are patent.    Miscellaneous: None                                       X-Ray Knee 3 View Right (Final result)  Result time 02/03/23 19:03:18      Final result by Sarthak Nice MD (02/03/23 19:03:18)                   Impression:      1. No acute displaced fracture or dislocation of the knee.      Electronically signed by: Sarthak Nice MD  Date:    02/03/2023  Time:    19:03               Narrative:    EXAMINATION:  XR KNEE 3 VIEW RIGHT    CLINICAL HISTORY:  Pain in unspecified knee    TECHNIQUE:  AP, lateral, and Merchant views of the right knee were performed.    COMPARISON:  None    FINDINGS:  Three views right knee.    No acute displaced fracture or dislocation of the knee.  No radiopaque foreign body.  No significant edema.  No large knee joint effusion.                                       Medications - No data to display  Medical Decision Making:   History:   Old Medical Records: I decided to obtain old medical records.  Initial Assessment:   Healthy 37-year-old female, here with pain to right knee secondary to remote trauma and then also new pain to the posterior calf  and popliteal fossa times days    Of note patient was seen in the emergency department at [facility] about a month ago for chest pain and was noted to be tachycardic and had a very extensive workup there including a D-dimer which was negative    She is currently chest pain-free and has no shortness of breath  Under initial vital signs she was very tachycardic to a heart rate of 114 but repeat vital signs were done without any intervention in her heart rate had improved to 100    Right lower extremity exam as noted  Differential Diagnosis:   Right leg contusion  Muscle strain  Baker's cyst  DVT  No clinical signs of infection  Clinical Tests:   Lab Tests: Ordered and Reviewed  Radiological Study: Ordered and Reviewed  ED Management:  I-STAT  Right knee x-ray and right lower extremity DVT study  If imaging studies are negative and labs are unremarkable, I do think that the patient is very well-appearing clinically would be safe for discharge home with outpatient follow-up and return precautions        Scribe Attestation:   Scribe #1: I performed the above scribed service and the documentation accurately describes the services I performed. I attest to the accuracy of the note.                 I, Dr. Megan Brady, personally performed the services described in this documentation. All medical record entries made by the scribe were at my direction and in my presence.  I have reviewed the chart and agree that the record reflects my personal performance and is accurate and complete. Megan Brady MD.  7:55 PM 02/03/2023  Clinical Impression:   Final diagnoses:  [M79.604] Right leg pain (Primary)  [M25.569] Knee pain        ED Disposition Condition    Discharge Stable          ED Prescriptions    None       Follow-up Information       Follow up With Specialties Details Why Contact Info    Judit Cardona MD Internal Medicine Call in 3 days  [address] [facility] [address] [phone]                Megan Brady MD  02/03/23 2057

## 2023-02-04 NOTE — ED NOTES
I-STAT Chem-8+ Results:   Value Reference Range   Sodium 140 136-145 mmol/L   Potassium  4.0 3.5-5.1 mmol/L   Chloride 106  mmol/L   Ionized Calcium 0.88 1.06-1.42 mmol/L   CO2 (measured) 24 23-29 mmol/L   Glucose 92  mg/dL   BUN 10 6-30 mg/dL   Creatinine 1.0 0.5-1.4 mg/dL   Hematocrit 46 36-54%

## 2024-01-10 ENCOUNTER — E-VISIT (OUTPATIENT)
Dept: FAMILY MEDICINE | Facility: CLINIC | Age: 39
End: 2024-01-10
Payer: OTHER GOVERNMENT

## 2024-01-10 DIAGNOSIS — R21 RASH: Primary | ICD-10-CM

## 2024-01-10 PROCEDURE — 99421 OL DIG E/M SVC 5-10 MIN: CPT | Mod: ,,, | Performed by: STUDENT IN AN ORGANIZED HEALTH CARE EDUCATION/TRAINING PROGRAM

## 2024-01-10 PROCEDURE — 99499 UNLISTED E&M SERVICE: CPT | Mod: 95,,, | Performed by: STUDENT IN AN ORGANIZED HEALTH CARE EDUCATION/TRAINING PROGRAM

## 2024-01-10 RX ORDER — KETOCONAZOLE 20 MG/G
CREAM TOPICAL 2 TIMES DAILY
Qty: 60 G | Refills: 2 | Status: SHIPPED | OUTPATIENT
Start: 2024-01-10 | End: 2024-01-24

## 2024-01-10 NOTE — PROGRESS NOTES
Patient ID: Megan Navarro is a 38 y.o. female.    Chief Complaint: Rash (Entered automatically based on patient selection in Patient Portal.)          274}  The patient initiated a request through [employer] on 1/10/2024 for evaluation and management with a chief complaint of Rash (Entered automatically based on patient selection in Patient Portal.)     I evaluated the questionnaire submission on 01/10/2024 .    [facility] Peq Evisit Rash    1/10/2024 11:53 AM CST - Filed by Patient   Do you agree to participate in an E-Visit? Yes   If you have any of the following symptoms, please present to your local ER or call 911:  I acknowledge   What is the main issue that you would like for your doctor to address today? Rash   Are you able to take your vital signs? No   Are you currently pregnant, could you be pregnant, or are you breast feeding? None of the above   How would you describe your skin problem? Rash   When did your symptoms first appear? 12/2/2023   Where is it located?  Groin;  Buttock/anus;  Other   Does it itch? Yes   Does it hurt? Yes   Where is the pain located? Where the skin change is noted   The pain came on: Suddenly   The pain has the character of: Burning   Frequency of the pain (How often does it appear)? Fluctuates at random   Please select the face that most closely captures your pain level: 8   Is there discharge or drainage? No   Is there bleeding? Yes   Describe the character Spots;  Raised;  Scaly;  Open;  Scabs   Describe the color Red   Has it changed over time? Grown in size   Frequency of skin problem Always there   Duration of the skin problem (how long does it stay when it is present) Never goes away   I have had a new exposure to No new exposures   I have had a new exposure to No new exposures   What have you used to treat the skin problem? Nothing   If you have used anything for treatment, has it helped the symptoms? No   Other generalized symptoms that you associate with the rash No other  symptoms   Provide any information you feel is important to your history not asked above    At least one photo is required for treatment to be provided. You can upload a maximum of three photos of the affected area.            Active Problem List with Overview Notes    Diagnosis Date Noted    Encounter for well woman exam with routine gynecological exam 08/10/2022    Chronic constipation 08/10/2022      Recent Labs Obtained:  Lab Results   Component Value Date    WBC 7.53 01/05/2023    HGB 13.8 01/05/2023    HCT 46 02/03/2023    MCV 98 01/05/2023     01/05/2023     (L) 01/05/2023    K 4.1 01/05/2023    GLU 73 01/05/2023    CREATININE 0.7 01/05/2023    EGFRNORACEVR >60 01/05/2023    HGBA1C 4.6 07/07/2022      Review of patient's allergies indicates:   Allergen Reactions    Amoxicillin Rash       Encounter Diagnosis   Name Primary?    Rash Yes        No orders of the defined types were placed in this encounter.     Medications Ordered This Encounter   Medications    ketoconazole (NIZORAL) 2 % cream     Sig: Apply topically 2 (two) times daily. for 14 days     Dispense:  60 g     Refill:  2        E-Visit Time Tracking:    Day 1 Time (in minutes): 7     Total Time (in minutes): 7    This is the extent of this pleasant patient's concerns at this present time. She did not feel chest pain upon exertion. No unilateral leg swelling, calf tenderness, or calf pain.    274}